# Patient Record
Sex: FEMALE | Race: WHITE | NOT HISPANIC OR LATINO | Employment: FULL TIME | URBAN - METROPOLITAN AREA
[De-identification: names, ages, dates, MRNs, and addresses within clinical notes are randomized per-mention and may not be internally consistent; named-entity substitution may affect disease eponyms.]

---

## 2017-10-06 ENCOUNTER — AMBULATORY - HEALTHEAST (OUTPATIENT)
Dept: NURSING | Facility: CLINIC | Age: 32
End: 2017-10-06

## 2017-12-02 ENCOUNTER — RECORDS - HEALTHEAST (OUTPATIENT)
Dept: ADMINISTRATIVE | Facility: OTHER | Age: 32
End: 2017-12-02

## 2018-03-20 ENCOUNTER — COMMUNICATION - HEALTHEAST (OUTPATIENT)
Dept: SURGERY | Facility: CLINIC | Age: 33
End: 2018-03-20

## 2018-04-09 ENCOUNTER — OFFICE VISIT - HEALTHEAST (OUTPATIENT)
Dept: SURGERY | Facility: CLINIC | Age: 33
End: 2018-04-09

## 2018-04-09 DIAGNOSIS — E66.01 OBESITY, CLASS III, BMI 40-49.9 (MORBID OBESITY) (H): ICD-10-CM

## 2018-04-09 ASSESSMENT — MIFFLIN-ST. JEOR: SCORE: 1927.29

## 2018-04-10 ENCOUNTER — AMBULATORY - HEALTHEAST (OUTPATIENT)
Dept: SURGERY | Facility: CLINIC | Age: 33
End: 2018-04-10

## 2018-05-29 ENCOUNTER — COMMUNICATION - HEALTHEAST (OUTPATIENT)
Dept: SURGERY | Facility: CLINIC | Age: 33
End: 2018-05-29

## 2018-08-16 ENCOUNTER — HOSPITAL ENCOUNTER (EMERGENCY)
Facility: CLINIC | Age: 33
Discharge: HOME | End: 2018-08-16
Attending: NURSE PRACTITIONER | Admitting: NURSE PRACTITIONER

## 2018-08-16 VITALS
TEMPERATURE: 97.8 F | RESPIRATION RATE: 18 BRPM | HEART RATE: 78 BPM | DIASTOLIC BLOOD PRESSURE: 110 MMHG | SYSTOLIC BLOOD PRESSURE: 154 MMHG | OXYGEN SATURATION: 97 %

## 2018-08-16 DIAGNOSIS — M54.6 ACUTE LEFT-SIDED THORACIC BACK PAIN: ICD-10-CM

## 2018-08-16 PROCEDURE — 99282 EMERGENCY DEPT VISIT SF MDM: CPT

## 2018-08-16 RX ORDER — METHOCARBAMOL 500 MG/1
1000 TABLET, FILM COATED ORAL 3 TIMES DAILY PRN
Qty: 20 TABLET | Refills: 0 | Status: SHIPPED | OUTPATIENT
Start: 2018-08-16

## 2018-08-16 ASSESSMENT — ENCOUNTER SYMPTOMS
BACK PAIN: 1
NUMBNESS: 0
ARTHRALGIAS: 1
SHORTNESS OF BREATH: 0
NECK PAIN: 0

## 2018-08-16 NOTE — LETTER
August 16, 2018      To Whom It May Concern:      Shena Chamorro was seen in our Emergency Department today, 08/16/18.  I expect her condition to improve over the next 3 days.  She may return to work/school when improved.    Sincerely,        Radha Ly RN

## 2018-08-16 NOTE — ED AVS SNAPSHOT
[facility] Emergency Department    [address]    [facility] [address]    Phone:  [phone]    Fax:  [phone]                                       Shena Chamorro   MRN: [id number]    Department:  [facility] Emergency Department   Date of Visit:  8/16/2018           Patient Information     Date Of Birth          1985        Your diagnoses for this visit were:     Acute left-sided thoracic back pain        You were seen by Shahida Joaquin, JOSÉ MIGUEL BONNER.      Follow-up Information     Follow up with Your primary care MD In 5 days.    Why:  As needed        Follow up with [facility] Emergency Department.    Specialty:  EMERGENCY MEDICINE    Why:  As needed, If symptoms worsen    Contact information:    [address]  [facility] [id number] [phone]        Discharge Instructions       Continue ibuprofen 600 mg every 6-8 hours.  Use robaxin as needed for severe pain.  No alcohol, driving or operating machinery while taking robaxin.  Continue to remain as active as possible; this will help you heal quicker.  Follow-up with your primary MD if not improved in one week.  Return to ED with increased pain, weakness/numbness in arm, redness or swelling to area or any further concerns.    Discharge Instructions  Back Pain  You were seen today for back pain. Back pain can have many causes, but most will get better without surgery or other specific treatment. Sometimes there is a herniated ( slipped ) disc. We do not usually do MRI scans to look for these right away, since most herniated discs will get better on their own with time.  Today, we did not find any evidence that your back pain was caused by a serious condition. However, sometimes symptoms develop over time and cannot be found during an emergency visit, so it is very important that you follow up with your primary provider.  Generally, every Emergency Department visit should have a  follow-up clinic visit with either a primary or a specialty clinic/provider. Please follow-up as instructed by your emergency provider today.    Return to the Emergency Department if:    You develop a fever with your back pain.     You have weakness or change in sensation in one or both legs.    You lose control of your bowels or bladder, or cannot empty your bladder (cannot pee).    Your pain gets much worse.     Follow-up with your provider:    Unless your pain has completely gone away, please make an appointment with your provider within one week. Most of the routine care for back pain is available in a clinic and not the Emergency Department. You may need further management of your back pain, such as more pain medication, imaging such as an X-ray or MRI, or physical therapy.    What can I do to help myself?    Remain Active -- People are often afraid that they will hurt their back further or delay recovery by remaining active, but this is one of the best things you can do for your back. In fact, staying in bed for a long time to rest is not recommended. Studies have shown that people with low back pain recover faster when they remain active. Movement helps to bring blood flow to the muscles and relieve muscle spasms as well as preventing loss of muscle strength.    Heat -- Using a heating pad can help with low back pain during the first few weeks. Do not sleep with a heating pad, as you can be burned.     Pain medications - You may take a pain medication such as Tylenol  (acetaminophen), Advil , Motrin  (ibuprofen) or Aleve  (naproxen).  If you were given a prescription for medicine here today, be sure to read all of the information (including the package insert) that comes with your prescription.  This will include important information about the medicine, its side effects, and any warnings that you need to know about.  The pharmacist who fills the prescription can provide more information and answer questions  you may have about the medicine.  If you have questions or concerns that the pharmacist cannot address, please call or return to the Emergency Department.   Remember that you can always come back to the Emergency Department if you are not able to see your regular provider in the amount of time listed above, if you get any new symptoms, or if there is anything that worries you.      24 Hour Appointment Hotline       To make an appointment at any [facility], call [phone] ([phone]). If you don't have a family doctor or clinic, we will help you find one. [facility] are conveniently located to serve the needs of you and your family.             Review of your medicines      START taking        Dose / Directions Last dose taken    methocarbamol 500 MG tablet   Commonly known as:  ROBAXIN   Dose:  1000 mg   Quantity:  20 tablet        Take 2 tablets (1,000 mg) by mouth 3 times daily as needed for muscle spasms   Refills:  0                Prescriptions were sent or printed at these locations (1 Prescription)                   Other Prescriptions                Printed at Department/Unit printer (1 of 1)         methocarbamol (ROBAXIN) 500 MG tablet                Orders Needing Specimen Collection     None      Pending Results     No orders found from 8/14/2018 to 8/17/2018.            Pending Culture Results     No orders found from 8/14/2018 to 8/17/2018.            Pending Results Instructions     If you had any lab results that were not finalized at the time of your Discharge, you can call the ED Lab Result RN at [phone]. You will be contacted by this team for any positive Lab results or changes in treatment. The nurses are available 7 days a week from 10A to 6:30P.  You can leave a message 24 hours per day and they will return your call.        Test Results From Your Hospital Stay               Clinical Quality Measure: Blood Pressure Screening     Your blood pressure was checked while  "you were in the emergency department today. The last reading we obtained was  BP: (!) 154/110 . Please read the guidelines below about what these numbers mean and what you should do about them.  If your systolic blood pressure (the top number) is less than 120 and your diastolic blood pressure (the bottom number) is less than 80, then your blood pressure is normal. There is nothing more that you need to do about it.  If your systolic blood pressure (the top number) is 120-139 or your diastolic blood pressure (the bottom number) is 80-89, your blood pressure may be higher than it should be. You should have your blood pressure rechecked within a year by a primary care provider.  If your systolic blood pressure (the top number) is 140 or greater or your diastolic blood pressure (the bottom number) is 90 or greater, you may have high blood pressure. High blood pressure is treatable, but if left untreated over time it can put you at risk for heart attack, stroke, or kidney failure. You should have your blood pressure rechecked by a primary care provider within the next 4 weeks.  If your provider in the emergency department today gave you specific instructions to follow-up with your doctor or provider even sooner than that, you should follow that instruction and not wait for up to 4 weeks for your follow-up visit.        Thank you for choosing [facility]       Thank you for choosing [facility] for your care. Our goal is always to provide you with excellent care. Hearing back from our patients is one way we can continue to improve our services. Please take a few minutes to complete the written survey that you may receive in the mail after you visit with us. Thank you!        [employer] Information     [employer] lets you send messages to your doctor, view your test results, renew your prescriptions, schedule appointments and more. To sign up, go to [email or web address] . Click on \"Log in\" on the left side of the screen, which " "will take you to the Welcome page. Then click on \"Sign up Now\" on the right side of the page.     You will be asked to enter the access code listed below, as well as some personal information. Please follow the directions to create your username and password.     Your access code is: [email or web address]  Expires: 2018 11:52 AM     Your access code will  in 90 days. If you need help or a new code, please call your [place] clinic or [phone].        Care EveryWhere ID     This is your Care EveryWhere ID. This could be used by other organizations to access your [place] medical records  [id number]        Equal Access to Services     MEAGHAN GARCÍA : [facility], grzegorz farmer, ebony fernandez, pauline cardoza. So [facility] [phone].    ATENCIÓN: Si habla español, tiene a bourgeois disposición servicios gratuitos de asistencia lingüística. Llame al [phone].    We comply with applicable federal civil rights laws and [place] laws. We do not discriminate on the basis of race, color, national origin, age, disability, sex, sexual orientation, or gender identity.            After Visit Summary       This is your record. Keep this with you and show to your community pharmacist(s) and doctor(s) at your next visit.                  "

## 2018-08-16 NOTE — ED TRIAGE NOTES
Patient presents with complaints of left shoulder pain which started a few days ago. Patient as no known injury. Patient took Advil at 0800. Patient moving shoulder during triage. CMS is intact.  ABC intact without need for intervention at this time.

## 2018-08-16 NOTE — ED PROVIDER NOTES
History     Chief Complaint:  Shoulder Pain    HPI   Shnea Chamorro is a 32 year old female who presents with left sided scapula pain. The patient stated that a couple or mornings ago she noticed a sharp, left sided shoulder pain that worsened with movement. She described the pain as sharp, and stated that it caused shakiness in her left arm when she would do simple activities such as picking up her cell phone due to the pain. The patient has tried hot pads and ibuprofen, but has seen no results, prompting her to visit the ED today. The patient denies any cervical or spinal pain, shortness of breath, numbness, palpitations or any other associated symptoms.      Allergies:  No Known Drug Allergies    Medications:    Robaxin    Past Medical History:    The patient denies any relevant past medical history.    Past Surgical History:    History reviewed. No pertinent past surgical history.    Family History:    The patient denies any relevant family medical history.    Social History:  Marital Status: Single  Smoking Status: No  Alcohol Use: No    Review of Systems   Respiratory: Negative for shortness of breath.    Musculoskeletal: Positive for arthralgias and back pain. Negative for neck pain.   Neurological: Negative for numbness.   All other systems reviewed and are negative.      Physical Exam     Patient Vitals for the past 24 hrs:   BP Temp Pulse Heart Rate Resp SpO2   08/16/18 1108 (!) 154/110 97.8  F (36.6  C) 78 78 18 97 %       Physical Exam  Constitutional: Alert, attentive, GCS 15.    HEENT: Conjunctiva normal. Mucous membranes moist  CV: regular rate and rhythm; no murmurs, rubs or gallups. Radial pulses 2+ and symmetric. Cap refill <2 seconds.  Respiratory: Effort normal. Lungs clear to auscultation bilaterally. No crackles/rubs/wheezes.  Good air movement.  MSK: Left upper back: Severe palpable muscle spasms with reproducible pain. No cervical tenderness. No pain with palpation of clavicle or  shoulder bones. Full range of motion of shoulder. No pain with palpation of humerus. No erythema or swelling. Distal sensation intact.   Neurological: Alert, attentive.  Skin: Skin is warm, dry and pink.  No rashes or petechiae.  Psychiatric: Normal affect.      Emergency Department Course     Emergency Department Course:  Past medical records, nursing notes, and vitals reviewed.  1114: I performed an exam of the patient and obtained history, as documented above.    I rechecked the patient. Findings and plan explained to the Patient. Patient discharged home with instructions regarding supportive care, medications, and reasons to return. The importance of close follow-up was reviewed.     Impression & Plan      Medical Decision Making:  Shena Chamorro is a 32 year old female presents for evaluation of left upper back and shoulder pain.  On exam she has reproducible tenderness.  There is no redness or swelling or erythema to suggest cellulitis or infectious joint.  No rash suggestive of shingles.  Based upon HPI, PMH and physical exam, I very low suspicion that pain is related to ACS or aortic pathology.  After shared decision making with patient, will not undergo cardiac workup.  She has full range of motion of the shoulder and no bony tenderness, therefore x-rays not indicated due to low suspicion of fracture or dislocation.  She has good distal strength.  There is no evidence of neurovascular compromise.  At this time it appears pain is most likely related to muscle spasms.  She is encouraged to continue 600 mg ibuprofen for Robaxin as needed.  She states she will follow-up in PCP if no improvement within the next few days.  Reasons to return to ED discussed including increasing pain, signs of infection, neurovascular compromise or any further concerns.       Diagnosis:    ICD-10-CM   1. Acute left-sided thoracic back pain M54.6       Disposition:  discharged to home    Discharge Medications:  New Prescriptions     METHOCARBAMOL (ROBAXIN) 500 MG TABLET    Take 2 tablets (1,000 mg) by mouth 3 times daily as needed for muscle spasms         Jesus Hinds  8/16/2018   [facility] EMERGENCY DEPARTMENT    I, Jesus Hinds, am serving as a scribe at 11:14 AM on 8/16/2018 to document services personally performed by Shahida Joaquin APRN based on my observations and the provider's statements to me.        Shahida Joaquin APRN CNP  08/16/18 1252       Shahida Joaquin APRN CNP  08/16/18 1252

## 2018-08-16 NOTE — DISCHARGE INSTRUCTIONS
Continue ibuprofen 600 mg every 6-8 hours.  Use robaxin as needed for severe pain.  No alcohol, driving or operating machinery while taking robaxin.  Continue to remain as active as possible; this will help you heal quicker.  Follow-up with your primary MD if not improved in one week.  Return to ED with increased pain, weakness/numbness in arm, redness or swelling to area or any further concerns.    Discharge Instructions  Back Pain  You were seen today for back pain. Back pain can have many causes, but most will get better without surgery or other specific treatment. Sometimes there is a herniated ( slipped ) disc. We do not usually do MRI scans to look for these right away, since most herniated discs will get better on their own with time.  Today, we did not find any evidence that your back pain was caused by a serious condition. However, sometimes symptoms develop over time and cannot be found during an emergency visit, so it is very important that you follow up with your primary provider.  Generally, every Emergency Department visit should have a follow-up clinic visit with either a primary or a specialty clinic/provider. Please follow-up as instructed by your emergency provider today.    Return to the Emergency Department if:    You develop a fever with your back pain.     You have weakness or change in sensation in one or both legs.    You lose control of your bowels or bladder, or cannot empty your bladder (cannot pee).    Your pain gets much worse.     Follow-up with your provider:    Unless your pain has completely gone away, please make an appointment with your provider within one week. Most of the routine care for back pain is available in a clinic and not the Emergency Department. You may need further management of your back pain, such as more pain medication, imaging such as an X-ray or MRI, or physical therapy.    What can I do to help myself?    Remain Active -- People are often afraid that they will  hurt their back further or delay recovery by remaining active, but this is one of the best things you can do for your back. In fact, staying in bed for a long time to rest is not recommended. Studies have shown that people with low back pain recover faster when they remain active. Movement helps to bring blood flow to the muscles and relieve muscle spasms as well as preventing loss of muscle strength.    Heat -- Using a heating pad can help with low back pain during the first few weeks. Do not sleep with a heating pad, as you can be burned.     Pain medications - You may take a pain medication such as Tylenol  (acetaminophen), Advil , Motrin  (ibuprofen) or Aleve  (naproxen).  If you were given a prescription for medicine here today, be sure to read all of the information (including the package insert) that comes with your prescription.  This will include important information about the medicine, its side effects, and any warnings that you need to know about.  The pharmacist who fills the prescription can provide more information and answer questions you may have about the medicine.  If you have questions or concerns that the pharmacist cannot address, please call or return to the Emergency Department.   Remember that you can always come back to the Emergency Department if you are not able to see your regular provider in the amount of time listed above, if you get any new symptoms, or if there is anything that worries you.

## 2018-11-09 ENCOUNTER — AMBULATORY - HEALTHEAST (OUTPATIENT)
Dept: NURSING | Facility: CLINIC | Age: 33
End: 2018-11-09

## 2019-04-19 ENCOUNTER — OFFICE VISIT - HEALTHEAST (OUTPATIENT)
Dept: FAMILY MEDICINE | Facility: CLINIC | Age: 34
End: 2019-04-19

## 2019-04-19 DIAGNOSIS — I10 ESSENTIAL HYPERTENSION, BENIGN: ICD-10-CM

## 2019-04-19 DIAGNOSIS — F41.1 GENERALIZED ANXIETY DISORDER: ICD-10-CM

## 2019-04-19 DIAGNOSIS — Z00.00 ROUTINE MEDICAL EXAM: ICD-10-CM

## 2019-04-19 DIAGNOSIS — E66.01 MORBID OBESITY (H): ICD-10-CM

## 2019-04-19 DIAGNOSIS — Z13.220 SCREENING, LIPID: ICD-10-CM

## 2019-04-19 LAB
ANION GAP SERPL CALCULATED.3IONS-SCNC: 8 MMOL/L (ref 5–18)
BUN SERPL-MCNC: 11 MG/DL (ref 8–22)
CALCIUM SERPL-MCNC: 8.8 MG/DL (ref 8.5–10.5)
CHLORIDE BLD-SCNC: 110 MMOL/L (ref 98–107)
CHOLEST SERPL-MCNC: 164 MG/DL
CO2 SERPL-SCNC: 24 MMOL/L (ref 22–31)
CREAT SERPL-MCNC: 0.72 MG/DL (ref 0.6–1.1)
FASTING STATUS PATIENT QL REPORTED: YES
GFR SERPL CREATININE-BSD FRML MDRD: >60 ML/MIN/1.73M2
GLUCOSE BLD-MCNC: 86 MG/DL (ref 70–125)
HDLC SERPL-MCNC: 54 MG/DL
LDLC SERPL CALC-MCNC: 90 MG/DL
POTASSIUM BLD-SCNC: 4 MMOL/L (ref 3.5–5)
SODIUM SERPL-SCNC: 142 MMOL/L (ref 136–145)
TRIGL SERPL-MCNC: 98 MG/DL
TSH SERPL DL<=0.005 MIU/L-ACNC: 2.79 UIU/ML (ref 0.3–5)

## 2019-04-19 ASSESSMENT — MIFFLIN-ST. JEOR: SCORE: 2024.69

## 2019-04-22 ENCOUNTER — COMMUNICATION - HEALTHEAST (OUTPATIENT)
Dept: FAMILY MEDICINE | Facility: CLINIC | Age: 34
End: 2019-04-22

## 2019-05-20 ENCOUNTER — OFFICE VISIT - HEALTHEAST (OUTPATIENT)
Dept: FAMILY MEDICINE | Facility: CLINIC | Age: 34
End: 2019-05-20

## 2019-05-20 DIAGNOSIS — I10 ESSENTIAL HYPERTENSION, BENIGN: ICD-10-CM

## 2019-05-20 DIAGNOSIS — F41.1 GENERALIZED ANXIETY DISORDER: ICD-10-CM

## 2019-05-20 LAB
ANION GAP SERPL CALCULATED.3IONS-SCNC: 11 MMOL/L (ref 5–18)
BUN SERPL-MCNC: 14 MG/DL (ref 8–22)
CALCIUM SERPL-MCNC: 8.9 MG/DL (ref 8.5–10.5)
CHLORIDE BLD-SCNC: 107 MMOL/L (ref 98–107)
CO2 SERPL-SCNC: 23 MMOL/L (ref 22–31)
CREAT SERPL-MCNC: 0.76 MG/DL (ref 0.6–1.1)
GFR SERPL CREATININE-BSD FRML MDRD: >60 ML/MIN/1.73M2
GLUCOSE BLD-MCNC: 91 MG/DL (ref 70–125)
POTASSIUM BLD-SCNC: 3.9 MMOL/L (ref 3.5–5)
SODIUM SERPL-SCNC: 141 MMOL/L (ref 136–145)

## 2019-06-25 ENCOUNTER — OFFICE VISIT - HEALTHEAST (OUTPATIENT)
Dept: FAMILY MEDICINE | Facility: CLINIC | Age: 34
End: 2019-06-25

## 2019-06-25 DIAGNOSIS — F41.1 GENERALIZED ANXIETY DISORDER: ICD-10-CM

## 2019-06-25 DIAGNOSIS — I10 ESSENTIAL HYPERTENSION, BENIGN: ICD-10-CM

## 2019-07-17 ENCOUNTER — COMMUNICATION - HEALTHEAST (OUTPATIENT)
Dept: FAMILY MEDICINE | Facility: CLINIC | Age: 34
End: 2019-07-17

## 2019-07-30 ENCOUNTER — OFFICE VISIT - HEALTHEAST (OUTPATIENT)
Dept: FAMILY MEDICINE | Facility: CLINIC | Age: 34
End: 2019-07-30

## 2019-07-30 DIAGNOSIS — I10 ESSENTIAL HYPERTENSION, BENIGN: ICD-10-CM

## 2019-07-30 DIAGNOSIS — E66.01 MORBID OBESITY (H): ICD-10-CM

## 2019-09-13 ENCOUNTER — OFFICE VISIT - HEALTHEAST (OUTPATIENT)
Dept: SURGERY | Facility: CLINIC | Age: 34
End: 2019-09-13

## 2019-09-13 ENCOUNTER — AMBULATORY - HEALTHEAST (OUTPATIENT)
Dept: LAB | Facility: CLINIC | Age: 34
End: 2019-09-13

## 2019-09-13 DIAGNOSIS — M54.5 LOW BACK PAIN, UNSPECIFIED BACK PAIN LATERALITY, UNSPECIFIED CHRONICITY, WITH SCIATICA PRESENCE UNSPECIFIED: ICD-10-CM

## 2019-09-13 DIAGNOSIS — E66.01 MORBID OBESITY WITH BMI OF 40.0-44.9, ADULT (H): ICD-10-CM

## 2019-09-13 DIAGNOSIS — N39.3 STRESS INCONTINENCE OF URINE: ICD-10-CM

## 2019-09-13 LAB
ALBUMIN SERPL-MCNC: 4.3 G/DL (ref 3.5–5)
ALP SERPL-CCNC: 67 U/L (ref 45–120)
ALT SERPL W P-5'-P-CCNC: 27 U/L (ref 0–45)
AST SERPL W P-5'-P-CCNC: 22 U/L (ref 0–40)
BILIRUB DIRECT SERPL-MCNC: 0.1 MG/DL
BILIRUB SERPL-MCNC: 0.3 MG/DL (ref 0–1)
ERYTHROCYTE [DISTWIDTH] IN BLOOD BY AUTOMATED COUNT: 13.2 % (ref 11–14.5)
FERRITIN SERPL-MCNC: 63 NG/ML (ref 10–130)
FOLATE SERPL-MCNC: 12.3 NG/ML
HCT VFR BLD AUTO: 43 % (ref 35–47)
HGB BLD-MCNC: 14.6 G/DL (ref 12–16)
MCH RBC QN AUTO: 28.7 PG (ref 27–34)
MCHC RBC AUTO-ENTMCNC: 34 G/DL (ref 32–36)
MCV RBC AUTO: 85 FL (ref 80–100)
PLATELET # BLD AUTO: 265 THOU/UL (ref 140–440)
PMV BLD AUTO: 11 FL (ref 8.5–12.5)
PROT SERPL-MCNC: 7.8 G/DL (ref 6–8)
PTH-INTACT SERPL-MCNC: 123 PG/ML (ref 10–86)
RBC # BLD AUTO: 5.09 MILL/UL (ref 3.8–5.4)
VIT B12 SERPL-MCNC: 482 PG/ML (ref 213–816)
WBC: 10 THOU/UL (ref 4–11)

## 2019-09-13 ASSESSMENT — MIFFLIN-ST. JEOR: SCORE: 2051.46

## 2019-09-15 LAB — HBA1C MFR BLD: 5 % (ref 4.2–6.1)

## 2019-09-16 LAB
25(OH)D3 SERPL-MCNC: 23.3 NG/ML (ref 30–80)
ANNOTATION COMMENT IMP: NORMAL
VIT A SERPL-MCNC: 0.46 MG/L (ref 0.3–1.2)
VITAMIN A (RETINYL PALMITATE): <0.02 MG/L (ref 0–0.1)

## 2019-09-17 LAB — ZINC SERPL-MCNC: 83.2 UG/DL (ref 60–120)

## 2019-09-18 LAB — VIT B1 PYROPHOSHATE BLD-SCNC: 117 NMOL/L (ref 70–180)

## 2019-09-29 ENCOUNTER — COMMUNICATION - HEALTHEAST (OUTPATIENT)
Dept: SURGERY | Facility: CLINIC | Age: 34
End: 2019-09-29

## 2019-09-29 ENCOUNTER — AMBULATORY - HEALTHEAST (OUTPATIENT)
Dept: SURGERY | Facility: CLINIC | Age: 34
End: 2019-09-29

## 2019-09-29 DIAGNOSIS — E56.9 VITAMIN DEFICIENCY: ICD-10-CM

## 2019-09-29 DIAGNOSIS — R79.89 INCREASED PTH LEVEL: ICD-10-CM

## 2019-10-03 ENCOUNTER — COMMUNICATION - HEALTHEAST (OUTPATIENT)
Dept: FAMILY MEDICINE | Facility: CLINIC | Age: 34
End: 2019-10-03

## 2019-10-08 ENCOUNTER — OFFICE VISIT - HEALTHEAST (OUTPATIENT)
Dept: FAMILY MEDICINE | Facility: CLINIC | Age: 34
End: 2019-10-08

## 2019-10-08 DIAGNOSIS — Z23 NEED FOR INFLUENZA VACCINATION: ICD-10-CM

## 2019-10-08 DIAGNOSIS — E66.01 MORBID OBESITY WITH BMI OF 40.0-44.9, ADULT (H): ICD-10-CM

## 2019-10-08 DIAGNOSIS — I10 ESSENTIAL HYPERTENSION, BENIGN: ICD-10-CM

## 2019-10-11 ENCOUNTER — COMMUNICATION - HEALTHEAST (OUTPATIENT)
Dept: FAMILY MEDICINE | Facility: CLINIC | Age: 34
End: 2019-10-11

## 2019-11-04 ENCOUNTER — OFFICE VISIT - HEALTHEAST (OUTPATIENT)
Dept: FAMILY MEDICINE | Facility: CLINIC | Age: 34
End: 2019-11-04

## 2019-11-04 DIAGNOSIS — I10 ESSENTIAL HYPERTENSION, BENIGN: ICD-10-CM

## 2019-11-04 DIAGNOSIS — G89.29 CHRONIC LEFT-SIDED LOW BACK PAIN WITHOUT SCIATICA: ICD-10-CM

## 2019-11-04 DIAGNOSIS — M54.50 CHRONIC LEFT-SIDED LOW BACK PAIN WITHOUT SCIATICA: ICD-10-CM

## 2019-11-18 ENCOUNTER — OFFICE VISIT - HEALTHEAST (OUTPATIENT)
Dept: SURGERY | Facility: CLINIC | Age: 34
End: 2019-11-18

## 2019-11-18 DIAGNOSIS — Z71.3 NUTRITIONAL COUNSELING: ICD-10-CM

## 2019-11-18 DIAGNOSIS — E66.01 MORBID OBESITY (H): ICD-10-CM

## 2019-11-18 DIAGNOSIS — E66.01 OBESITY, CLASS III, BMI 40-49.9 (MORBID OBESITY) (H): ICD-10-CM

## 2019-11-18 ASSESSMENT — MIFFLIN-ST. JEOR: SCORE: 2081.85

## 2019-12-02 ENCOUNTER — OFFICE VISIT - HEALTHEAST (OUTPATIENT)
Dept: SURGERY | Facility: CLINIC | Age: 34
End: 2019-12-02

## 2019-12-02 DIAGNOSIS — E66.01 MORBID OBESITY (H): ICD-10-CM

## 2019-12-12 ENCOUNTER — COMMUNICATION - HEALTHEAST (OUTPATIENT)
Dept: FAMILY MEDICINE | Facility: CLINIC | Age: 34
End: 2019-12-12

## 2019-12-12 DIAGNOSIS — I10 ESSENTIAL HYPERTENSION, BENIGN: ICD-10-CM

## 2019-12-13 ENCOUNTER — OFFICE VISIT - HEALTHEAST (OUTPATIENT)
Dept: SURGERY | Facility: CLINIC | Age: 34
End: 2019-12-13

## 2019-12-13 DIAGNOSIS — Z71.3 NUTRITIONAL COUNSELING: ICD-10-CM

## 2019-12-13 DIAGNOSIS — E66.01 OBESITY, CLASS III, BMI 40-49.9 (MORBID OBESITY) (H): ICD-10-CM

## 2019-12-13 ASSESSMENT — MIFFLIN-ST. JEOR: SCORE: 2080.03

## 2019-12-31 ENCOUNTER — RECORDS - HEALTHEAST (OUTPATIENT)
Dept: ADMINISTRATIVE | Facility: OTHER | Age: 34
End: 2019-12-31

## 2020-01-02 ENCOUNTER — COMMUNICATION - HEALTHEAST (OUTPATIENT)
Dept: SCHEDULING | Facility: CLINIC | Age: 35
End: 2020-01-02

## 2020-01-09 ENCOUNTER — COMMUNICATION - HEALTHEAST (OUTPATIENT)
Dept: SCHEDULING | Facility: CLINIC | Age: 35
End: 2020-01-09

## 2020-01-10 ENCOUNTER — OFFICE VISIT - HEALTHEAST (OUTPATIENT)
Dept: SURGERY | Facility: CLINIC | Age: 35
End: 2020-01-10

## 2020-01-10 DIAGNOSIS — Z71.3 NUTRITIONAL COUNSELING: ICD-10-CM

## 2020-01-10 DIAGNOSIS — E66.01 OBESITY, CLASS III, BMI 40-49.9 (MORBID OBESITY) (H): ICD-10-CM

## 2020-01-10 ASSESSMENT — MIFFLIN-ST. JEOR: SCORE: 2064.16

## 2020-01-13 ENCOUNTER — OFFICE VISIT - HEALTHEAST (OUTPATIENT)
Dept: SURGERY | Facility: CLINIC | Age: 35
End: 2020-01-13

## 2020-01-13 DIAGNOSIS — E66.01 MORBID OBESITY (H): ICD-10-CM

## 2020-01-15 ENCOUNTER — COMMUNICATION - HEALTHEAST (OUTPATIENT)
Dept: FAMILY MEDICINE | Facility: CLINIC | Age: 35
End: 2020-01-15

## 2020-01-17 ENCOUNTER — OFFICE VISIT - HEALTHEAST (OUTPATIENT)
Dept: FAMILY MEDICINE | Facility: CLINIC | Age: 35
End: 2020-01-17

## 2020-01-17 DIAGNOSIS — J04.0 LARYNGITIS: ICD-10-CM

## 2020-01-17 ASSESSMENT — MIFFLIN-ST. JEOR: SCORE: 2112.69

## 2020-02-14 ENCOUNTER — OFFICE VISIT - HEALTHEAST (OUTPATIENT)
Dept: SURGERY | Facility: CLINIC | Age: 35
End: 2020-02-14

## 2020-02-14 DIAGNOSIS — I10 ESSENTIAL HYPERTENSION, BENIGN: ICD-10-CM

## 2020-02-14 DIAGNOSIS — Z71.3 NUTRITIONAL COUNSELING: ICD-10-CM

## 2020-02-14 DIAGNOSIS — E66.01 OBESITY, CLASS III, BMI 40-49.9 (MORBID OBESITY) (H): ICD-10-CM

## 2020-02-14 ASSESSMENT — MIFFLIN-ST. JEOR: SCORE: 2074.59

## 2020-03-10 ENCOUNTER — COMMUNICATION - HEALTHEAST (OUTPATIENT)
Dept: SURGERY | Facility: CLINIC | Age: 35
End: 2020-03-10

## 2020-03-13 ENCOUNTER — COMMUNICATION - HEALTHEAST (OUTPATIENT)
Dept: SURGERY | Facility: CLINIC | Age: 35
End: 2020-03-13

## 2020-03-23 ENCOUNTER — OFFICE VISIT - HEALTHEAST (OUTPATIENT)
Dept: SURGERY | Facility: CLINIC | Age: 35
End: 2020-03-23

## 2020-03-23 DIAGNOSIS — E66.01 OBESITY, CLASS III, BMI 40-49.9 (MORBID OBESITY) (H): ICD-10-CM

## 2020-03-23 DIAGNOSIS — Z71.3 NUTRITIONAL COUNSELING: ICD-10-CM

## 2020-03-23 DIAGNOSIS — I10 ESSENTIAL HYPERTENSION, BENIGN: ICD-10-CM

## 2020-03-30 ENCOUNTER — COMMUNICATION - HEALTHEAST (OUTPATIENT)
Dept: SURGERY | Facility: CLINIC | Age: 35
End: 2020-03-30

## 2020-04-13 ENCOUNTER — COMMUNICATION - HEALTHEAST (OUTPATIENT)
Dept: FAMILY MEDICINE | Facility: CLINIC | Age: 35
End: 2020-04-13

## 2020-04-23 ENCOUNTER — OFFICE VISIT - HEALTHEAST (OUTPATIENT)
Dept: SURGERY | Facility: CLINIC | Age: 35
End: 2020-04-23

## 2020-04-23 DIAGNOSIS — I10 ESSENTIAL HYPERTENSION: ICD-10-CM

## 2020-04-23 DIAGNOSIS — E66.01 MORBID OBESITY (H): ICD-10-CM

## 2020-04-29 ENCOUNTER — AMBULATORY - HEALTHEAST (OUTPATIENT)
Dept: SURGERY | Facility: CLINIC | Age: 35
End: 2020-04-29

## 2020-05-26 ENCOUNTER — COMMUNICATION - HEALTHEAST (OUTPATIENT)
Dept: FAMILY MEDICINE | Facility: CLINIC | Age: 35
End: 2020-05-26

## 2020-05-27 ENCOUNTER — OFFICE VISIT - HEALTHEAST (OUTPATIENT)
Dept: FAMILY MEDICINE | Facility: CLINIC | Age: 35
End: 2020-05-27

## 2020-05-27 DIAGNOSIS — I10 ESSENTIAL HYPERTENSION, BENIGN: ICD-10-CM

## 2020-05-27 DIAGNOSIS — F41.1 GENERALIZED ANXIETY DISORDER: ICD-10-CM

## 2020-05-27 DIAGNOSIS — E66.01 MORBID OBESITY WITH BMI OF 40.0-44.9, ADULT (H): ICD-10-CM

## 2020-05-27 ASSESSMENT — MIFFLIN-ST. JEOR: SCORE: 2085.02

## 2020-06-01 ENCOUNTER — COMMUNICATION - HEALTHEAST (OUTPATIENT)
Dept: FAMILY MEDICINE | Facility: CLINIC | Age: 35
End: 2020-06-01

## 2020-06-25 ENCOUNTER — COMMUNICATION - HEALTHEAST (OUTPATIENT)
Dept: FAMILY MEDICINE | Facility: CLINIC | Age: 35
End: 2020-06-25

## 2020-06-25 ENCOUNTER — AMBULATORY - HEALTHEAST (OUTPATIENT)
Dept: SURGERY | Facility: CLINIC | Age: 35
End: 2020-06-25

## 2020-06-25 ENCOUNTER — COMMUNICATION - HEALTHEAST (OUTPATIENT)
Dept: SURGERY | Facility: CLINIC | Age: 35
End: 2020-06-25

## 2020-06-25 ENCOUNTER — SURGERY - HEALTHEAST (OUTPATIENT)
Dept: SURGERY | Facility: CLINIC | Age: 35
End: 2020-06-25

## 2020-06-25 DIAGNOSIS — E66.01 MORBID OBESITY (H): ICD-10-CM

## 2020-06-25 DIAGNOSIS — Z11.59 ENCOUNTER FOR SCREENING FOR OTHER VIRAL DISEASES: ICD-10-CM

## 2020-06-25 DIAGNOSIS — I10 HTN (HYPERTENSION): ICD-10-CM

## 2020-06-26 ENCOUNTER — COMMUNICATION - HEALTHEAST (OUTPATIENT)
Dept: SURGERY | Facility: CLINIC | Age: 35
End: 2020-06-26

## 2020-06-26 ENCOUNTER — SURGERY - HEALTHEAST (OUTPATIENT)
Dept: SURGERY | Facility: CLINIC | Age: 35
End: 2020-06-26

## 2020-07-01 ENCOUNTER — AMBULATORY - HEALTHEAST (OUTPATIENT)
Dept: SURGERY | Facility: CLINIC | Age: 35
End: 2020-07-01

## 2020-07-01 DIAGNOSIS — K21.9 ACID REFLUX: ICD-10-CM

## 2020-07-01 DIAGNOSIS — Z98.84 S/P BARIATRIC SURGERY: ICD-10-CM

## 2020-07-01 DIAGNOSIS — R63.4 RAPID WEIGHT LOSS: ICD-10-CM

## 2020-07-01 DIAGNOSIS — Z71.89 ENCOUNTER FOR PRE-BARIATRIC SURGERY COUNSELING AND EDUCATION: ICD-10-CM

## 2020-07-01 DIAGNOSIS — Z01.818 PRE-OP TESTING: ICD-10-CM

## 2020-07-10 ENCOUNTER — COMMUNICATION - HEALTHEAST (OUTPATIENT)
Dept: FAMILY MEDICINE | Facility: CLINIC | Age: 35
End: 2020-07-10

## 2020-07-10 DIAGNOSIS — I10 ESSENTIAL HYPERTENSION, BENIGN: ICD-10-CM

## 2020-07-13 ENCOUNTER — OFFICE VISIT - HEALTHEAST (OUTPATIENT)
Dept: FAMILY MEDICINE | Facility: CLINIC | Age: 35
End: 2020-07-13

## 2020-07-13 ENCOUNTER — AMBULATORY - HEALTHEAST (OUTPATIENT)
Dept: FAMILY MEDICINE | Facility: CLINIC | Age: 35
End: 2020-07-13

## 2020-07-13 ENCOUNTER — RECORDS - HEALTHEAST (OUTPATIENT)
Dept: GENERAL RADIOLOGY | Facility: CLINIC | Age: 35
End: 2020-07-13

## 2020-07-13 DIAGNOSIS — Z11.59 ENCOUNTER FOR SCREENING FOR OTHER VIRAL DISEASES: ICD-10-CM

## 2020-07-13 DIAGNOSIS — G89.29 CHRONIC LEFT-SIDED LOW BACK PAIN WITHOUT SCIATICA: ICD-10-CM

## 2020-07-13 DIAGNOSIS — M54.50 CHRONIC LEFT-SIDED LOW BACK PAIN WITHOUT SCIATICA: ICD-10-CM

## 2020-07-13 DIAGNOSIS — Z01.818 PREOP GENERAL PHYSICAL EXAM: ICD-10-CM

## 2020-07-13 DIAGNOSIS — I10 ESSENTIAL HYPERTENSION, BENIGN: ICD-10-CM

## 2020-07-13 DIAGNOSIS — Z01.818 PRE-OP TESTING: ICD-10-CM

## 2020-07-13 DIAGNOSIS — Z01.818 ENCOUNTER FOR OTHER PREPROCEDURAL EXAMINATION: ICD-10-CM

## 2020-07-13 DIAGNOSIS — F41.1 GENERALIZED ANXIETY DISORDER: ICD-10-CM

## 2020-07-13 DIAGNOSIS — E66.01 MORBID OBESITY WITH BMI OF 40.0-44.9, ADULT (H): ICD-10-CM

## 2020-07-13 LAB
ALBUMIN SERPL-MCNC: 4.2 G/DL (ref 3.5–5)
ALBUMIN UR-MCNC: ABNORMAL MG/DL
ALP SERPL-CCNC: 50 U/L (ref 45–120)
ALT SERPL W P-5'-P-CCNC: 50 U/L (ref 0–45)
ANION GAP SERPL CALCULATED.3IONS-SCNC: 8 MMOL/L (ref 5–18)
APPEARANCE UR: CLEAR
APTT PPP: 34 SECONDS (ref 24–37)
AST SERPL W P-5'-P-CCNC: 30 U/L (ref 0–40)
BACTERIA #/AREA URNS HPF: ABNORMAL HPF
BASOPHILS # BLD AUTO: 0.1 THOU/UL (ref 0–0.2)
BASOPHILS NFR BLD AUTO: 1 % (ref 0–2)
BILIRUB SERPL-MCNC: 0.4 MG/DL (ref 0–1)
BILIRUB UR QL STRIP: ABNORMAL
BUN SERPL-MCNC: 10 MG/DL (ref 8–22)
CALCIUM SERPL-MCNC: 8.8 MG/DL (ref 8.5–10.5)
CHLORIDE BLD-SCNC: 106 MMOL/L (ref 98–107)
CO2 SERPL-SCNC: 26 MMOL/L (ref 22–31)
COLOR UR AUTO: YELLOW
CREAT SERPL-MCNC: 0.8 MG/DL (ref 0.6–1.1)
EOSINOPHIL # BLD AUTO: 0.2 THOU/UL (ref 0–0.4)
EOSINOPHIL NFR BLD AUTO: 2 % (ref 0–6)
ERYTHROCYTE [DISTWIDTH] IN BLOOD BY AUTOMATED COUNT: 12.7 % (ref 11–14.5)
GFR SERPL CREATININE-BSD FRML MDRD: >60 ML/MIN/1.73M2
GLUCOSE BLD-MCNC: 90 MG/DL (ref 70–125)
GLUCOSE UR STRIP-MCNC: NEGATIVE MG/DL
HCT VFR BLD AUTO: 42 % (ref 35–47)
HGB BLD-MCNC: 14.3 G/DL (ref 12–16)
HGB UR QL STRIP: ABNORMAL
INR PPP: 1.06 (ref 0.9–1.1)
KETONES UR STRIP-MCNC: NEGATIVE MG/DL
LEUKOCYTE ESTERASE UR QL STRIP: NEGATIVE
LYMPHOCYTES # BLD AUTO: 2.6 THOU/UL (ref 0.8–4.4)
LYMPHOCYTES NFR BLD AUTO: 31 % (ref 20–40)
MCH RBC QN AUTO: 29.5 PG (ref 27–34)
MCHC RBC AUTO-ENTMCNC: 34.1 G/DL (ref 32–36)
MCV RBC AUTO: 86 FL (ref 80–100)
MONOCYTES # BLD AUTO: 0.4 THOU/UL (ref 0–0.9)
MONOCYTES NFR BLD AUTO: 4 % (ref 2–10)
MUCOUS THREADS #/AREA URNS LPF: ABNORMAL LPF
NEUTROPHILS # BLD AUTO: 5.3 THOU/UL (ref 2–7.7)
NEUTROPHILS NFR BLD AUTO: 62 % (ref 50–70)
NITRATE UR QL: NEGATIVE
PH UR STRIP: 7 [PH] (ref 5–8)
PLATELET # BLD AUTO: 267 THOU/UL (ref 140–440)
PMV BLD AUTO: 8.7 FL (ref 7–10)
POTASSIUM BLD-SCNC: 3.7 MMOL/L (ref 3.5–5)
PROT SERPL-MCNC: 7.1 G/DL (ref 6–8)
RBC # BLD AUTO: 4.86 MILL/UL (ref 3.8–5.4)
RBC #/AREA URNS AUTO: ABNORMAL HPF
SODIUM SERPL-SCNC: 140 MMOL/L (ref 136–145)
SP GR UR STRIP: 1.02 (ref 1–1.03)
SPERM #/AREA URNS HPF: PRESENT /[HPF]
SQUAMOUS #/AREA URNS AUTO: ABNORMAL LPF
UROBILINOGEN UR STRIP-ACNC: ABNORMAL
WBC #/AREA URNS AUTO: ABNORMAL HPF
WBC: 8.5 THOU/UL (ref 4–11)

## 2020-07-13 ASSESSMENT — MIFFLIN-ST. JEOR: SCORE: 2034.78

## 2020-07-14 ENCOUNTER — ANESTHESIA - HEALTHEAST (OUTPATIENT)
Dept: SURGERY | Facility: CLINIC | Age: 35
End: 2020-07-14

## 2020-07-15 ENCOUNTER — SURGERY - HEALTHEAST (OUTPATIENT)
Dept: SURGERY | Facility: CLINIC | Age: 35
End: 2020-07-15

## 2020-07-15 LAB
ATRIAL RATE - MUSE: 72 BPM
DIASTOLIC BLOOD PRESSURE - MUSE: NORMAL
INTERPRETATION ECG - MUSE: NORMAL
P AXIS - MUSE: 53 DEGREES
PR INTERVAL - MUSE: 158 MS
QRS DURATION - MUSE: 104 MS
QT - MUSE: 398 MS
QTC - MUSE: 435 MS
R AXIS - MUSE: 68 DEGREES
SYSTOLIC BLOOD PRESSURE - MUSE: NORMAL
T AXIS - MUSE: 50 DEGREES
VENTRICULAR RATE- MUSE: 72 BPM

## 2020-07-15 ASSESSMENT — MIFFLIN-ST. JEOR: SCORE: 2053.45

## 2020-07-17 ENCOUNTER — COMMUNICATION - HEALTHEAST (OUTPATIENT)
Dept: SURGERY | Facility: CLINIC | Age: 35
End: 2020-07-17

## 2020-07-17 DIAGNOSIS — Z98.84 S/P BARIATRIC SURGERY: ICD-10-CM

## 2020-07-17 DIAGNOSIS — K21.9 ACID REFLUX: ICD-10-CM

## 2020-07-18 ENCOUNTER — COMMUNICATION - HEALTHEAST (OUTPATIENT)
Dept: SURGERY | Facility: CLINIC | Age: 35
End: 2020-07-18

## 2020-07-20 ENCOUNTER — COMMUNICATION - HEALTHEAST (OUTPATIENT)
Dept: SURGERY | Facility: CLINIC | Age: 35
End: 2020-07-20

## 2020-07-21 ENCOUNTER — AMBULATORY - HEALTHEAST (OUTPATIENT)
Dept: SURGERY | Facility: CLINIC | Age: 35
End: 2020-07-21

## 2020-07-21 ENCOUNTER — COMMUNICATION - HEALTHEAST (OUTPATIENT)
Dept: FAMILY MEDICINE | Facility: CLINIC | Age: 35
End: 2020-07-21

## 2020-07-21 DIAGNOSIS — Z98.84 BARIATRIC SURGERY STATUS: ICD-10-CM

## 2020-07-21 DIAGNOSIS — Z71.3 NUTRITIONAL COUNSELING: ICD-10-CM

## 2020-07-23 ENCOUNTER — OFFICE VISIT - HEALTHEAST (OUTPATIENT)
Dept: FAMILY MEDICINE | Facility: CLINIC | Age: 35
End: 2020-07-23

## 2020-07-23 DIAGNOSIS — Z98.84 S/P LAPAROSCOPIC SLEEVE GASTRECTOMY: ICD-10-CM

## 2020-07-23 DIAGNOSIS — E66.01 MORBID OBESITY WITH BMI OF 40.0-44.9, ADULT (H): ICD-10-CM

## 2020-07-23 ASSESSMENT — MIFFLIN-ST. JEOR: SCORE: 1938.06

## 2020-07-28 ENCOUNTER — OFFICE VISIT - HEALTHEAST (OUTPATIENT)
Dept: SURGERY | Facility: CLINIC | Age: 35
End: 2020-07-28

## 2020-07-28 DIAGNOSIS — Z98.84 S/P LAPAROSCOPIC SLEEVE GASTRECTOMY: ICD-10-CM

## 2020-10-14 ENCOUNTER — COMMUNICATION - HEALTHEAST (OUTPATIENT)
Dept: FAMILY MEDICINE | Facility: CLINIC | Age: 35
End: 2020-10-14

## 2020-10-14 ENCOUNTER — COMMUNICATION - HEALTHEAST (OUTPATIENT)
Dept: SURGERY | Facility: CLINIC | Age: 35
End: 2020-10-14

## 2020-10-14 DIAGNOSIS — J04.0 LARYNGITIS: ICD-10-CM

## 2020-10-16 ENCOUNTER — COMMUNICATION - HEALTHEAST (OUTPATIENT)
Dept: SURGERY | Facility: CLINIC | Age: 35
End: 2020-10-16

## 2020-10-16 DIAGNOSIS — K21.9 ACID REFLUX: ICD-10-CM

## 2020-10-16 DIAGNOSIS — Z98.84 S/P BARIATRIC SURGERY: ICD-10-CM

## 2020-11-25 ENCOUNTER — COMMUNICATION - HEALTHEAST (OUTPATIENT)
Dept: SURGERY | Facility: CLINIC | Age: 35
End: 2020-11-25

## 2020-12-03 ENCOUNTER — AMBULATORY - HEALTHEAST (OUTPATIENT)
Dept: SURGERY | Facility: CLINIC | Age: 35
End: 2020-12-03

## 2020-12-03 DIAGNOSIS — K59.00 CONSTIPATION: ICD-10-CM

## 2020-12-03 DIAGNOSIS — Z98.84 BARIATRIC SURGERY STATUS: ICD-10-CM

## 2021-01-06 ENCOUNTER — COMMUNICATION - HEALTHEAST (OUTPATIENT)
Dept: FAMILY MEDICINE | Facility: CLINIC | Age: 36
End: 2021-01-06

## 2021-01-06 DIAGNOSIS — I10 ESSENTIAL HYPERTENSION, BENIGN: ICD-10-CM

## 2021-01-14 ENCOUNTER — COMMUNICATION - HEALTHEAST (OUTPATIENT)
Dept: FAMILY MEDICINE | Facility: CLINIC | Age: 36
End: 2021-01-14

## 2021-01-15 ENCOUNTER — COMMUNICATION - HEALTHEAST (OUTPATIENT)
Dept: SURGERY | Facility: CLINIC | Age: 36
End: 2021-01-15

## 2021-01-19 ENCOUNTER — COMMUNICATION - HEALTHEAST (OUTPATIENT)
Dept: FAMILY MEDICINE | Facility: CLINIC | Age: 36
End: 2021-01-19

## 2021-01-19 DIAGNOSIS — K58.9 IRRITABLE BOWEL SYNDROME, UNSPECIFIED TYPE: ICD-10-CM

## 2021-01-21 ENCOUNTER — COMMUNICATION - HEALTHEAST (OUTPATIENT)
Dept: FAMILY MEDICINE | Facility: CLINIC | Age: 36
End: 2021-01-21

## 2021-02-01 ENCOUNTER — COMMUNICATION - HEALTHEAST (OUTPATIENT)
Dept: FAMILY MEDICINE | Facility: CLINIC | Age: 36
End: 2021-02-01

## 2021-02-12 ENCOUNTER — COMMUNICATION - HEALTHEAST (OUTPATIENT)
Dept: ADMINISTRATIVE | Facility: CLINIC | Age: 36
End: 2021-02-12

## 2021-02-12 ENCOUNTER — COMMUNICATION - HEALTHEAST (OUTPATIENT)
Dept: FAMILY MEDICINE | Facility: CLINIC | Age: 36
End: 2021-02-12

## 2021-02-12 DIAGNOSIS — F41.1 GENERALIZED ANXIETY DISORDER: ICD-10-CM

## 2021-02-22 ENCOUNTER — COMMUNICATION - HEALTHEAST (OUTPATIENT)
Dept: SURGERY | Facility: CLINIC | Age: 36
End: 2021-02-22

## 2021-02-22 DIAGNOSIS — Z98.84 S/P BARIATRIC SURGERY: ICD-10-CM

## 2021-02-22 DIAGNOSIS — K21.9 ACID REFLUX: ICD-10-CM

## 2021-02-24 ENCOUNTER — COMMUNICATION - HEALTHEAST (OUTPATIENT)
Dept: SURGERY | Facility: CLINIC | Age: 36
End: 2021-02-24

## 2021-02-24 DIAGNOSIS — R63.4 RAPID WEIGHT LOSS: ICD-10-CM

## 2021-02-24 DIAGNOSIS — Z98.84 S/P BARIATRIC SURGERY: ICD-10-CM

## 2021-04-20 ENCOUNTER — COMMUNICATION - HEALTHEAST (OUTPATIENT)
Dept: SURGERY | Facility: CLINIC | Age: 36
End: 2021-04-20

## 2021-04-20 DIAGNOSIS — Z98.84 S/P BARIATRIC SURGERY: ICD-10-CM

## 2021-04-20 DIAGNOSIS — R63.4 RAPID WEIGHT LOSS: ICD-10-CM

## 2021-05-24 ENCOUNTER — COMMUNICATION - HEALTHEAST (OUTPATIENT)
Dept: FAMILY MEDICINE | Facility: CLINIC | Age: 36
End: 2021-05-24

## 2021-05-24 DIAGNOSIS — F41.1 GENERALIZED ANXIETY DISORDER: ICD-10-CM

## 2021-05-26 ENCOUNTER — RECORDS - HEALTHEAST (OUTPATIENT)
Dept: FAMILY MEDICINE | Facility: CLINIC | Age: 36
End: 2021-05-26

## 2021-05-28 NOTE — PROGRESS NOTES
1. Generalized anxiety disorder  buPROPion (WELLBUTRIN XL) 150 MG 24 hr tablet   2. Essential hypertension, benign  Basic Metabolic Panel        Plan: Since the escitalopram did not really work out very well for her, we can try changing her to bupropion 150 mg a day.  She was warned that certainly she has side effects to any of these current medications and she is willing to try something a bit different.  She also has some familiarity with it because her sister was on this medication.  Hopefully this will work out better for her.  We can check in with her again in 6 to 8 weeks and see how she is doing on his new medication.    Her blood pressure certainly not where I would like to be at but it is a lot better than it was last time we checked it.  I would encourage her to continue trying to increase her exercise levels, losing weight, and eating better and continue with the hydrochlorothiazide and we can see her again in the same amount of time to recheck the blood pressure as well.    Subjective: 33-year-old female who is here today for recheck.  We put her on some escitalopram for some depression and anxiety about 6 weeks ago.  She really was not able to take it because she gets severe hot flashes from it.  She states that they were intolerable and she could not last out a few weeks to see if it would resolve on its own as it was pretty intense.  She is hoping was having a bit different that may not have such a same side effect is that.    Also, we started her on some hydrochlorothiazide for some blood pressure at that time, and she has been tolerating that very well.  She has been urinating somewhat more frequently but not too intolerable.  She has been checking her blood pressure and it is been better and it is better than when she is in here today.  She has no significant side effects to that medication.    Objective: Well-appearing female no acute distress.  Vital signs as noted.  Blood pressure recheck shows  her systolic much better her diastolics a little bit high.  Chest clear to auscultation.  Heart regular rate and rhythm.  Abdomen is benign.  Extremities without edema.

## 2021-05-30 NOTE — PROGRESS NOTES
1. Essential hypertension, benign  hydroCHLOROthiazide (HYDRODIURIL) 25 MG tablet   2. Generalized anxiety disorder  busPIRone (BUSPAR) 15 MG tablet        Plan: For her blood pressure, I think we can do a little bit better on this so we will put her up to 25 mg of the hydrochlorothiazide daily.  We can check back with her in 4 to 6 weeks to make sure that she is doing better with this.  We could also do some blood work at that time to make sure potassium is responding appropriately.  If she has any trouble with this in the meantime she will let me know.    For her anxiety, we want to stay with the same SSRI medication, but we will add buspirone at 7.5 mg twice a day to start.  We could increase this dose down the road if we need to.  She is not thrilled about taking a twice daily medication, but she will try to be compliant with that and see how things go.  Side effects and interactions of the medications discussed with the patient.  We will follow-up with her again in 4 to 6 weeks to see how things are coming along for her anxiety.    Subjective: 33-year-old female is here today for a couple of things.  We will recheck her blood pressure as we started her on some hydrochlorothiazide a bit ago.  She reports doing well with the medication and is not having any side effects or problems with it.  She does not really think that it helped her blood pressure all that much, and she does not really feel that a diuresis has been happening if she is not really been urinating anymore frequently than before.    Also for anxiety, she is on bupropion at 150 mg daily.  She has started to get some headaches, and she is not sure if it is from that or from her blood pressure or from her anxiety itself.  She would like to try something in addition to the bupropion if possible as she does not really want to go up to the maximum dose at this time.  She again does not have a lot of side effects other than possibly the headache she  mentioned before.    Objective: Well-appearing female no acute distress.  Vital signs as noted.  Ears are clear bilaterally.  Eyes and nose are normal.  Oropharynx is clear.  Chest clear to auscultation.  Heart regular rate and rhythm.  Neurologically intact and she is alert and oriented and pleasant today.

## 2021-05-30 NOTE — PROGRESS NOTES
1. Essential hypertension, benign     2. Morbid obesity (H)  Ambulatory referral to Bariatric Care: Surgical and Non-Surgical        Plan: We will keep her on her hydrochlorothiazide 25 mg a day.  She will continue to work on losing weight and exercise.  She will continue to monitor her blood pressure in the interim.    She is also interested today in a referral to someone to discuss her weight.  I suggested that we have her see the focused on the bariatric center to discuss nonsurgical ways that she could be helped in losing some weight and she is very excited about that and looking forward to that appointment.  We can follow-up with her then after she has been seen there and as needed.    Subjective: 33-year-old female is here today for a blood pressure recheck.  We did put her on an increased dose of hydrochlorothiazide, up to 25 mg a day about a month ago.  She states her blood pressures have been up and down but still not really been at goal much.  She does not any chest pain or shortness of breath.  She did not have any new side effects to the increased dose of medication.    Also, we are here to talk about her anxiety today.  We did put her up on some buspirone last month but she did not like the side effects of that medication.  So she stopped it and she also stopped her bupropion and stated that she just did not want to be on medications for anxiety and she reports that she is actually doing better off of things and she has not had headaches since going off of these 2 medications as well.  She will stay off of them then and she seems quite happy that she was able to get off of the medications.    Objective: Well-appearing female in no acute distress.  Vital signs as noted.  Chest clear to auscultation.  Heart regular rate and rhythm.  Extremities without edema.  Blood pressure is as noted.

## 2021-05-31 ENCOUNTER — RECORDS - HEALTHEAST (OUTPATIENT)
Dept: ADMINISTRATIVE | Facility: CLINIC | Age: 36
End: 2021-05-31

## 2021-05-31 VITALS — BODY MASS INDEX: 41.3 KG/M2 | HEIGHT: 68 IN

## 2021-05-31 VITALS — BODY MASS INDEX: 23.57 KG/M2 | WEIGHT: 155 LBS

## 2021-06-01 VITALS — BODY MASS INDEX: 42.75 KG/M2 | HEIGHT: 66 IN | WEIGHT: 266 LBS

## 2021-06-02 VITALS — BODY MASS INDEX: 42.9 KG/M2 | WEIGHT: 283.1 LBS | HEIGHT: 68 IN

## 2021-06-02 VITALS — BODY MASS INDEX: 43.87 KG/M2 | WEIGHT: 284.3 LBS

## 2021-06-02 NOTE — PROGRESS NOTES
ASSESSMENT:  1. Essential hypertension, benign    Due to that diastolic still being high on recheck and with her history of having it high when she has been checking at home, I am going to add a little bit of lisinopril 2.5 mg a day to the diuretic that she is already taking.  We will to try to make sure we maximize her treatment of her blood pressure if she indeed is going to think about having the surgery done in the near future.  I will bring her back in about a month to make sure that she is improving with the blood pressure, as well as the fact that she will be checking in with the bariatric clinic as well.    - lisinopril (PRINIVIL,ZESTRIL) 2.5 MG tablet; Take 1 tablet (2.5 mg total) by mouth daily.  Dispense: 30 tablet; Refill: 1    2. Morbid obesity with BMI of 40.0-44.9, adult (H)      3. Need for influenza vaccination    - Influenza,Seasonal,Quad,INJ =/>6months          PLAN:  There are no Patient Instructions on file for this visit.    Orders Placed This Encounter   Procedures     Influenza,Seasonal,Quad,INJ =/>6months     Medications Discontinued During This Encounter   Medication Reason     escitalopram oxalate (LEXAPRO) 5 MG tablet        No follow-ups on file.    CHIEF COMPLAINT:  Chief Complaint   Patient presents with     Hypertension     Follow up       HISTORY OF PRESENT ILLNESS:  Shena is a 33 y.o. female presenting to the clinic today with a check of her blood pressure.  She has noted to be having a bit of high blood pressure the last couple times she has been in here.  She is also dealing with the bariatric clinic and working with Dr. Garcia there.  She is actually planning on actually pursuing the surgery option soon for her obesity.  She is still having high blood pressure readings however and back in and try to get on top of those quickly so that her health can be maximized for when she is going to have the surgery in the next couple of months.  She is having any headaches or vision changes  or blurry vision or double vision, no shortness of breath or chest pain.    REVIEW OF SYSTEMS:     All other systems are negative.    PFSH:    Reviewed      TOBACCO USE:  Social History     Tobacco Use   Smoking Status Never Smoker   Smokeless Tobacco Never Used       VITALS:  Vitals:    10/08/19 1622 10/08/19 1646   BP: (!) 137/96 (!) 152/98   Patient Site: Left Arm Left Arm   Patient Position: Sitting Sitting   Cuff Size: Adult Large Adult Regular   Pulse: 94    SpO2: 98%    Weight: (!) 292 lb 6.4 oz (132.6 kg)      Wt Readings from Last 3 Encounters:   10/08/19 (!) 292 lb 6.4 oz (132.6 kg)   09/13/19 (!) 289 lb (131.1 kg)   07/30/19 (!) 287 lb 1.6 oz (130.2 kg)     Body mass index is 45.12 kg/m .    PHYSICAL EXAM:  Constitutional:  Well appearing patient in no acute distress.  Vitals:  Per nursing notes.    HEENT:  Normocephalic, atraumatic.  Ears are clear bilaterally, with no fluid or redness, and landmarks visible.  Pupils are equal and reactive to light, extraocular muscles intact, visual fields are full.  Nose is normal, and oropharynx is clear without redness.    Neck is without lymphadenopathy.    Lungs:  Clear to auscultation bilaterally without wheezes, rales or rhonchi.   Cardiac:  Regular rate and rhythm without murmurs, rubs, or gallops.     Legs show no cyanosis, clubbing or edema.  Palpation of the distal pulses are normal and symmetric.    Neurologic:  Cranial nerves II-XII intact.   Normal and symmetric reflexes in extremities, with normal strength and sensation.  Psychiatric:  Mood appropriate, memory intact.       MEDICATIONS:  Current Outpatient Medications   Medication Sig Dispense Refill     ergocalciferol (VITAMIN D2) 50,000 unit capsule Take 1 capsule (50,000 Units total) by mouth 3 (three) times a week. 39 capsule 0     hydroCHLOROthiazide (HYDRODIURIL) 25 MG tablet Take 1 tablet (25 mg total) by mouth daily. 90 tablet 3     levonorgestrel (MIRENA) 20 mcg/24 hr (5 years) IUD 1 each by  Intrauterine route once.       lisinopril (PRINIVIL,ZESTRIL) 2.5 MG tablet Take 1 tablet (2.5 mg total) by mouth daily. 30 tablet 1     No current facility-administered medications for this visit.

## 2021-06-03 VITALS
SYSTOLIC BLOOD PRESSURE: 152 MMHG | DIASTOLIC BLOOD PRESSURE: 98 MMHG | WEIGHT: 292.4 LBS | HEART RATE: 94 BPM | OXYGEN SATURATION: 98 % | BODY MASS INDEX: 45.12 KG/M2

## 2021-06-03 VITALS
WEIGHT: 289 LBS | BODY MASS INDEX: 43.8 KG/M2 | HEART RATE: 79 BPM | RESPIRATION RATE: 18 BRPM | SYSTOLIC BLOOD PRESSURE: 131 MMHG | DIASTOLIC BLOOD PRESSURE: 98 MMHG | HEIGHT: 68 IN | OXYGEN SATURATION: 98 %

## 2021-06-03 VITALS — WEIGHT: 293 LBS | HEIGHT: 68 IN | BODY MASS INDEX: 44.41 KG/M2

## 2021-06-03 VITALS
WEIGHT: 293 LBS | SYSTOLIC BLOOD PRESSURE: 126 MMHG | OXYGEN SATURATION: 99 % | HEART RATE: 85 BPM | DIASTOLIC BLOOD PRESSURE: 88 MMHG | BODY MASS INDEX: 45.71 KG/M2

## 2021-06-03 VITALS — WEIGHT: 284.8 LBS | BODY MASS INDEX: 43.95 KG/M2

## 2021-06-03 VITALS — WEIGHT: 287.1 LBS | BODY MASS INDEX: 44.3 KG/M2

## 2021-06-03 VITALS — BODY MASS INDEX: 44.41 KG/M2 | WEIGHT: 293 LBS | HEIGHT: 68 IN
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
## 2021-06-03 NOTE — PROGRESS NOTES
ASSESSMENT:  1. Essential hypertension, benign    Blood pressure is improving now on the medication we added.  She is tolerating the lisinopril very well.  She has no side effects or problems to it and notes that her blood pressure is slowly starting to get better.  She is still hoping to go through the bariatric procedure hopefully early in 2020.  We can watch how her blood pressure response as she starts to lose weight and adjust her medications accordingly.    2. Chronic left-sided low back pain without sciatica    And pain in her lower lumbar area.  I think this is mostly musculoskeletal since there is no radiation down her legs at all.  She is interested in doing some physical therapy which might help with her flexibility  - Ambulatory referral to PT/OT          PLAN:  There are no Patient Instructions on file for this visit.    Orders Placed This Encounter   Procedures     Ambulatory referral to PT/OT     Referral Priority:   Routine     Referral Type:   Physical Therapy or Occupational Therapy     Referral Reason:   Evaluation and Treatment     Requested Specialty:   Physical Therapy     Number of Visits Requested:   1     There are no discontinued medications.    No follow-ups on file.    CHIEF COMPLAINT:  Chief Complaint   Patient presents with     Hypertension     BP med check and low back pain - mainly Lt sided - x 2 years has seen Chiro, [facility], acupuncture, muscle relaxer.        HISTORY OF PRESENT ILLNESS:  Shena is a 33 y.o. female presenting to the clinic today here today for a follow-up of her blood pressure.  We started her on some lisinopril at 2.5 mg a day in addition to her hydrochlorothiazide.  She reports her blood pressures are slowly starting to improve and come down to a better range.  She has no side effects or problems to the medication at all.  She is hopeful that this blood pressure is better here today and she has no chest pain or shortness of breath or numbness or  tingling into her extremities.    She also is having some more issues with lumbar back pain typically in the left side.  She has had this a long time and has seen chiropractors and used ice and is done some therapy in the past.  She is wondering if it is more therapy that she can do or if she can try some heat on the area as well.  She has no pain into the buttocks or down into the lower extremities.  No numbness or tingling into the lower extremities.  No weakness noted.    REVIEW OF SYSTEMS:     All other systems are negative.    PFSH:    Reviewed      TOBACCO USE:  Social History     Tobacco Use   Smoking Status Never Smoker   Smokeless Tobacco Never Used       VITALS:  Vitals:    11/04/19 1214 11/04/19 1229   BP: (!) 130/92 126/88   Patient Site: Left Arm Left Arm   Patient Position: Sitting Sitting   Cuff Size: Adult Large Adult Large   Pulse: 85    SpO2: 99%    Weight: (!) 296 lb 3.2 oz (134.4 kg)      Wt Readings from Last 3 Encounters:   11/04/19 (!) 296 lb 3.2 oz (134.4 kg)   10/08/19 (!) 292 lb 6.4 oz (132.6 kg)   09/13/19 (!) 289 lb (131.1 kg)     Body mass index is 45.71 kg/m .    PHYSICAL EXAM:  Constitutional:  Well appearing patient in no acute distress.  Vitals:  Per nursing notes.    HEENT:  Normocephalic, atraumatic.  Ears are clear bilaterally, with no fluid or redness, and landmarks visible.  Pupils are equal and reactive to light, extraocular muscles intact, visual fields are full.  Nose is normal, and oropharynx is clear without redness.    Neck is without lymphadenopathy.    Lungs:  Clear to auscultation bilaterally without wheezes, rales or rhonchi.   Cardiac:  Regular rate and rhythm without murmurs, rubs, or gallops.     Legs show no cyanosis, clubbing or edema.  Palpation of the distal pulses are normal and symmetric.      Psychiatric:  Mood appropriate, memory intact.     Low back shows some tenderness to palpation of the paraspinal musculature more on the left side of the back.   Decreased range of motion to flexion and extension as well as to lateral flexion worse leaning to the right.  No sciatic notch tenderness.  Negative straight leg raise.  Normal strength sensation reflexes in all 4 extremities.    AMEDICATIONS:  Current Outpatient Medications   Medication Sig Dispense Refill     ergocalciferol (VITAMIN D2) 50,000 unit capsule Take 1 capsule (50,000 Units total) by mouth 3 (three) times a week. 39 capsule 0     hydroCHLOROthiazide (HYDRODIURIL) 25 MG tablet Take 1 tablet (25 mg total) by mouth daily. 90 tablet 3     levonorgestrel (MIRENA) 20 mcg/24 hr (5 years) IUD 1 each by Intrauterine route once.       lisinopril (PRINIVIL,ZESTRIL) 2.5 MG tablet Take 1 tablet (2.5 mg total) by mouth daily. 30 tablet 1     No current facility-administered medications for this visit.

## 2021-06-03 NOTE — PROGRESS NOTES
Health and Behavior Assessment with Intervention, Follow up (60 minutes): Met with patient 1:1 to review support system, psychological testing and mindful eating strategies.  Shena has started to make some changes in her eating and lifestyle but still needs to be more mindful about her eating.  She will follow-up with a list of activities and mindful eating strategies.  Diagnoses: F 41.1; E 66.01

## 2021-06-03 NOTE — PROGRESS NOTES
Initial Structured Weight Loss Supervised Diet Evaluation     Assessment:  Pt. is being seen today for initial RD nutritional evaluation. Pt. has been unsuccessful with non-surgical weight loss methods and is interested in bariatric surgery. Today we reviewed current eating habits and level of physical activity, and instructed on the changes that are required for successful bariatric outcomes.    Surgery of interest per pt: ROGER.    Workflow review:  Support Group: Not completed.  Psychology:In progress.  Lab work:Completed.  SWL:Yes 2nd visit (including visit Bariatrician)     Weight goal: At or below initial.    Patient Active Problem List:  Patient Active Problem List   Diagnosis     Essential hypertension, benign     Generalized anxiety disorder     Morbid obesity with BMI of 40.0-44.9, adult (H)     Low back pain     Urinary incontinence     Vitamin D deficiency     Increased PTH level       Pt's Initial Weight: 289 lbs  Weight: (!) 295 lb 11.2 oz (134.1 kg)  Weight loss from initial: -6.7  % Weight loss: -2.32 %    BMI: Body mass index is 45.63 kg/m .    Estimated RMR ([place]-St Jeor equation): 2090 calories    Food allergies, intolerances, and [place] customs: none     Diabetic: No  HbA1c:    Hemoglobin A1c   Date/Time Value Ref Range Status   09/13/2019 02:48 PM 5.0 4.2 - 6.1 % Final     Vitamins currently taking: none     Biggest struggle with weight loss:bad habits     Diet/Weight History  Method or Diet: Phentermine, Weight Watchers, Books,   # loss(-) or gain(+):50 lb weight loss with Phentermine     Socioeconomic Status:  Who does the grocery shopping for your household? None   Who prepares your meals at home? None     Diet Recall/Time:   Breakfast: none   Am Snack: none   Lunch: (1st meal of the day)-leftovers from the night before (always packs a lunch)   Pm snack:Crackers, Snack Salty Foods   Dinner: hotdish or grilled meats, veggies   HS Snack: none     Overnight eating: No     Per Diet recall  estimated protein: 40 grams    Meals per week away from home: 3-4 times/week (on weekends)     Beverages (Type/Oz. per day)  Water: 64 oz/day +   Alcohol: on special occasions   Other: Peach Tea ([place])-1 glass/day     Exercise  Type: no set regimen, some activity at work  Some back issues-will be starting PT next week     PES statement:   1. (NI-1.3)Excessive energy intake related to Food and nutrition related knowledge deficit concerning excessive energy/oral intake as evidenced by Intake of high caloric density foods/beverages (juice, soda, alcohol) at meals and/or snacks; large portions; skipping meals; Estimated intake that exceeds estimated daily energy intake; Frequent excessive fast food or restaurant intake; and BMI of 45.63 kg/m2.      Intervention    Nutrition Education:   1. Provided general overview of diet and lifestyle modifications needed to be a deemed a safe candidate for bariatric surgery.     Food/Nutrient Delivery:  2. Educated pt on eating three meals, with cutting out snacking.  3. Bariatric Plate: Pt and I discussed the importance of including a lean protein source (20-30 grams/meal), vegetables (included at lunch and dinner), one serving (15g) of carbohydrate, and limited added fat (1 tb/day) at each meal.   4. Discussed importance of adequate hydration after surgery, with goal of at least 64 oz of fluids/day.  5. Addressed avoiding all carbonated, caffeinated and sweetened drinks to prepare for bariatric surgery.     Nutrition Counselin. Mindful eating techniques: Encouraged slow meal pace, chewing foods to applesauce consistency for 20-30 minutes/meal.   7. Discussed  fluids 30 minutes before, during, and after meal to prevent dumping syndrome and discomfort post bariatric surgery.     Instructions/Goals:     1. Keeping fluid separate from meals.   2. Eating slowly (20-30 minutes/meal).     Handouts Provided:   Keys to Success for Bariatric Surgery  Bariatric Plate  Lean  Protein sources      Monitor/Evaluation:  Pt. s target weight: no gain from initial visit, pt. verbalized understanding.     Plan for next visit:   Educate on dumping syndrome and reading food labels.  (Final Supervised Diet visit with RD) pre/post-op  diet progression, give review of surgery process.  Review [place] to Bariatric Success    Visit length: 30 minutes.     ABN signed: Yes

## 2021-06-04 VITALS
HEIGHT: 68 IN | DIASTOLIC BLOOD PRESSURE: 94 MMHG | BODY MASS INDEX: 44.41 KG/M2 | RESPIRATION RATE: 18 BRPM | OXYGEN SATURATION: 98 % | TEMPERATURE: 98.1 F | SYSTOLIC BLOOD PRESSURE: 121 MMHG | WEIGHT: 293 LBS | HEART RATE: 100 BPM

## 2021-06-04 VITALS — BODY MASS INDEX: 45.7 KG/M2 | HEIGHT: 67 IN | WEIGHT: 291.19 LBS

## 2021-06-04 VITALS
WEIGHT: 267.5 LBS | HEART RATE: 88 BPM | SYSTOLIC BLOOD PRESSURE: 108 MMHG | HEIGHT: 67 IN | OXYGEN SATURATION: 99 % | BODY MASS INDEX: 41.99 KG/M2 | DIASTOLIC BLOOD PRESSURE: 74 MMHG

## 2021-06-04 VITALS
BODY MASS INDEX: 44.92 KG/M2 | OXYGEN SATURATION: 98 % | TEMPERATURE: 98.3 F | HEIGHT: 67 IN | HEART RATE: 82 BPM | DIASTOLIC BLOOD PRESSURE: 92 MMHG | WEIGHT: 286.2 LBS | SYSTOLIC BLOOD PRESSURE: 138 MMHG

## 2021-06-04 VITALS — BODY MASS INDEX: 44.41 KG/M2 | HEIGHT: 68 IN | WEIGHT: 293 LBS

## 2021-06-04 VITALS
SYSTOLIC BLOOD PRESSURE: 125 MMHG | DIASTOLIC BLOOD PRESSURE: 92 MMHG | BODY MASS INDEX: 44.41 KG/M2 | HEIGHT: 68 IN | HEART RATE: 79 BPM | WEIGHT: 293 LBS | OXYGEN SATURATION: 98 %

## 2021-06-04 VITALS — BODY MASS INDEX: 45.37 KG/M2 | WEIGHT: 293 LBS

## 2021-06-04 VITALS — BODY MASS INDEX: 44.22 KG/M2 | WEIGHT: 291.8 LBS | HEIGHT: 68 IN

## 2021-06-04 NOTE — TELEPHONE ENCOUNTER
Sinus pain and congestion started Sunday    Will check temp    Breathing ok    No severe pain    No ear pain    Not a lot of cough    Home treatments given.    Francesca King, RN   [facility] Medication Refill and Triage Nurse  8:25 AM  1/2/2020      Reason for Disposition    Sinus congestion as part of a cold, present < 10 days    Protocols used: SINUS PAIN AND CONGESTION-A-[place]

## 2021-06-04 NOTE — TELEPHONE ENCOUNTER
Refill Approved    Rx renewed per Medication Renewal Policy. Medication was last renewed on 10/8/19.    Shena Fontanez, [facility] Triage/Med Refill 12/13/2019     Requested Prescriptions   Pending Prescriptions Disp Refills     lisinopril (PRINIVIL,ZESTRIL) 2.5 MG tablet [Pharmacy Med Name: LISINOPRIL 2.5MG TABLETS] 30 tablet 0     Sig: TAKE 1 TABLET(2.5 MG) BY MOUTH DAILY       [facility]ce Inhibitors Refill Protocol Passed - 12/12/2019  2:16 PM        Passed - PCP or prescribing provider visit in past 12 months       Last office visit with prescriber/PCP: 11/4/2019 Danilo Gonzalez MD OR same dept: 11/4/2019 Danilo Gonzalez MD OR same specialty: 11/4/2019 Danilo Gonzalez MD  Last physical: 4/19/2019 Last MTM visit: Visit date not found   Next visit within 3 mo: Visit date not found  Next physical within 3 mo: Visit date not found  Prescriber OR PCP: Danilo Gonzalez MD  Last diagnosis associated with med order: 1. Essential hypertension, benign  - lisinopril (PRINIVIL,ZESTRIL) 2.5 MG tablet [Pharmacy Med Name: LISINOPRIL 2.5MG TABLETS]; TAKE 1 TABLET(2.5 MG) BY MOUTH DAILY  Dispense: 30 tablet; Refill: 0    If protocol passes may refill for 12 months if within 3 months of last provider visit (or a total of 15 months).             Passed - Serum Potassium in past 12 months     Lab Results   Component Value Date    Potassium 3.9 05/20/2019             Passed - Blood pressure filed in past 12 months     BP Readings from Last 1 Encounters:   11/04/19 126/88             Passed - Serum Creatinine in past 12 months     Creatinine   Date Value Ref Range Status   05/20/2019 0.76 0.60 - 1.10 mg/dL Final

## 2021-06-04 NOTE — PROGRESS NOTES
Follow Up Surgical Weight Loss Supervised Diet Evaluation    Assessment:  Pt. is being seen today for a follow up RD nutritional evaluation. Pt. has been unsuccessful with non-surgical weight loss methods and is interested in bariatric surgery. Today we reviewed current eating habits and level of physical activity, and instructed on the changes that are required for successful bariatric outcomes.    Surgery of interest per pt: RNY.    Workflow review:  Support Group: Not completed.  Unable to make the last one, hectic schedule.   Psychology:In progress. Next Dec 30th   Lab work:Completed.  SWL:Yes 3rd Visit (including Bariatrician Visit)     Weight goal: At or below initial.    Patient Active Problem List:  Patient Active Problem List   Diagnosis     Essential hypertension, benign     Generalized anxiety disorder     Morbid obesity with BMI of 40.0-44.9, adult (H)     Low back pain     Urinary incontinence     Vitamin D deficiency     Increased PTH level       Pt's Initial Weight: 289 lbs  Weight: (!) 295 lb 4.8 oz (133.9 kg)  Weight loss from initial: -6.3  % Weight loss: -2.18 %    BMI: Body mass index is 45.57 kg/m .    Estimated RMR ([place]-St Jeor equation): 2088 calories    Progress over past month: No drinking before and after meals, waiting 30 minutes.  Incorporating more veggies into the diet.     Diabetic: No  HbA1c:    Hemoglobin A1c   Date/Time Value Ref Range Status   09/13/2019 02:48 PM 5.0 4.2 - 6.1 % Final     Diet Recall/Time:   Breakfast: granola bars or skipping   Am Snack: none   Lunch: Lasagna or Chicken and Broccoli or Beef Sausage and Potatoes   Pm snack: none   Dinner: same as lunches (she meal preps enough for both lunch and supper)   HS Snack: occasionally-cookies or goldfish crackers     Overnight eating: No     Per Diet recall estimated protein: 40 grams    Meals per week away from home: 2 times/week (sit down restaurant)     Meal duration: at least 20 minutes.     Beverages (Type/Oz.  per day)  Water: at least 64 oz/day   Coffee: 1 cup/day  Alcohol: rarely   Other: Peach Tea      fluids by 30 minutes before, during meal, and waiting 30 minutes after meal before drinking fluids: Yes    Exercise  Type: Starting PT, however no set regimen    PES statement:   1. (NI-1.3)Excessive energy intake related to Food and nutrition related knowledge deficit concerning excessive energy/oral intake as evidenced large portions; Estimated intake that exceeds estimated daily energy intake; Frequent excessive fast food or restaurant intake; and BMI of 45.57 kg/m2.      Intervention    Nutrition Education:   1. Provided general overview of diet and lifestyle modifications needed to be a deemed a safe candidate for bariatric surgery.   2. Educated pt on how to read a food label: choosing foods with than 10 grams fat and 10 grams sugar per serving to avoid dumping syndrome.  3.   Discussed portion sizes and utilization of smaller plate at meal times to help control overall portion sizes.     Food/Nutrient Delivery:  3. Educated pt on eating three meals, with cutting out snacking.  4. Bariatric Plate: Pt and I discussed the importance of including a lean protein source (20-30 grams/meal), vegetables (included at lunch and dinner), one serving (15g) of carbohydrate, and limited added fat (1 tb/day) at each meal.    5. Educated pt on using a protein powder drink as a meal replacement and/or supplement after bariatric surgery.   6. Discussed importance of adequate hydration after surgery, with goal of at least 64 oz of fluids/day.    Nutrition Counselin. Mindful eating techniques: Encouraged slow meal pace, chewing foods to applesauce consistency for 20-30 minutes/meal.   8. Discussed  fluids 30 minutes before, during, and after meal to prevent dumping syndrome and discomfort post bariatric surgery.     Instructions/Goals:     1. Utilize smaller plate (salad size) to help with overall portion  control at lunch and supper.   2. Start protein supplement as a meal replacement to help with consistency of breakfast daily.    Handouts Provided:   No handouts provided this visit.       Monitor/Evaluation:  Pt. s target weight: no gain from initial visit, pt. verbalized understanding.     Plan for next visit:   Educate on dumping syndrome and reading food labels.  (Final Supervised Diet visit with RD) pre/post-op  diet progression, give review of surgery process.  Review [place] to Bariatric Success    Visit length: 30 minutes.     ABN signed: Yes

## 2021-06-05 NOTE — TELEPHONE ENCOUNTER
"RN triage from patient  She is reporting that on 12/3/19 she had a replacement IUD put in.  She reports since then that she has had nausea day and night. She states she had some \"weird bleeding\" as well.  She had the IUD removed yesterday at her OB/GYN and was told if the IUD was causing the nausea the relief should \"be immediate\"  Ruled out pregnancy at OB/GYN only thing found was a 3 mm cyst.  Patient states that the nausea continues same as before. Only has been eating \"bits and pieces\"  The last 2 weeks the nausea became more severe and has not been able to eat a full meal.   Urine is dark yellow, maybe urinates 2-3 times in 24 hours. Fluid intake is 8 oz a day, restricting because of nausea.  Has had a cold at beginning of year and coughing hard can cause lightheadedness but otherwise denies this or trouble breathing.  Gave disposition to be seen in ED now. Patient was agreeable and will go to [facility].  Lavonne Fraser, RN  [facility] Triage Nurse  4:21 PM  1/9/2020          Reason for Disposition    [1] Drinking very little AND [2] dehydration suspected (e.g., no urine > 12 hours, very dry mouth, very lightheaded)    Protocols used: NAUSEA-A-AH      "

## 2021-06-05 NOTE — PROGRESS NOTES
Follow Up Surgical Weight Loss Supervised Diet Evaluation    Assessment:  Pt. is being seen today for a follow up RD nutritional evaluation. Pt. has been unsuccessful with non-surgical weight loss methods and is interested in bariatric surgery. Today we reviewed current eating habits and level of physical activity, and instructed on the changes that are required for successful bariatric outcomes.    Surgery of interest per pt: ROGER.    Workflow review:  Support Group: Plans on attending this Tuesday 1/14. .  Psychology:In progress.  Lab work:Completed.  SWL:Yes 4th Visit      Weight goal: At or below initial.    Patient Active Problem List:  Patient Active Problem List   Diagnosis     Essential hypertension, benign     Generalized anxiety disorder     Morbid obesity with BMI of 40.0-44.9, adult (H)     Low back pain     Urinary incontinence     Vitamin D deficiency     Increased PTH level       Pt's Initial Weight: 289 lbs  Weight: (!) 291 lb 12.8 oz (132.4 kg)  Weight loss from initial: -2.8  % Weight loss: -0.97 %    BMI: Body mass index is 45.03 kg/m .    Estimated RMR (Hampton-St Jeor equation): 2072 calories    Progress over past month: fluid intake has been down recently due to being ill over the holidays, usually consumes plenty of water throughout the day.  Patient reports that recently she has been only consuming about 8 oz/day of water, noting that everything his making her nauseated.  Patient did report that she was recently prescribed some zofran as well as medications for acid reflux, which she is starting today.  Otherwise, patient notes that she has been more consistent with eating breakfast.  Trying to incorporate more veggies and fruit into the diet.  Paying attention to portion sizes.      Diabetic: No  HbA1c:    Hemoglobin A1c   Date/Time Value Ref Range Status   09/13/2019 02:48 PM 5.0 4.2 - 6.1 % Final     Diet Recall/Time:   Breakfast: cottage cheese or yogurt   Am Snack: none   Lunch: hasn't  been eating well lately due to being ill (nausea and battling a cold)   Pm snack:none   Dinner: chicken with veggies   HS Snack: none     Overnight eating: No     Per Diet recall estimated protein: 35-40 grams    Meals per week away from home: 3 times/week (however less more recently)     Meal duration: 20-30 minutes.      fluids by 30 minutes before, during meal, and waiting 30 minutes after meal before drinking fluids: Yes (feels that she has achieved this goal and is doing well).      Exercise  Type: Basketball   Frequency (days/week): 1 day/week   Duration (minutes/day): 60 minutes     PES statement:   1. (NI-1.3)Excessive energy intake related to Food and nutrition related knowledge deficit concerning excessive energy/oral intake as evidenced by large portions; Estimated intake that exceeds estimated daily energy intake; Frequent excessive fast food or restaurant intake; and BMI of 45.03 kg/m2.      Intervention    Nutrition Education:   1. Provided general overview of diet and lifestyle modifications needed to be a deemed a safe candidate for bariatric surgery.   2. Educated pt on how to read a food label: choosing foods with than 10 grams fat and 10 grams sugar per serving to avoid dumping syndrome.  3. Dumping Syndrome: Described the mechanisms of syndrome, symptoms, and prevention tools from a dietary perspective.     Food/Nutrient Delivery:  4. Bariatric Plate: Pt and I discussed the importance of including a lean protein source (20-30 grams/meal), vegetables (included at lunch and dinner), one serving (15g) of carbohydrate, and limited added fat (1 tb/day) at each meal.   5. Educated pt on using a protein powder drink as a meal replacement and/or supplement after bariatric surgery.   6. Discussed importance of adequate hydration after surgery, with goal of at least 64 oz of fluids/day.  7. Addressed avoiding all carbonated, caffeinated and sweetened drinks to prepare for bariatric surgery.      Nutrition Counselin. Mindful eating techniques: Encouraged slow meal pace, chewing foods to applesauce consistency for 20-30 minutes/meal.   9. Discussed  fluids 30 minutes before, during, and after meal to prevent dumping syndrome and discomfort post bariatric surgery.     Instructions/Goals:     1. Focus on getting enough fluid in throughout the day to help prevent dehydration, consuming at least 64 oz/day.     Handouts Provided:   Dumping Syndrome     Monitor/Evaluation:  Pt. s target weight: no gain from initial visit, pt. verbalized understanding.     Plan for next visit:   Review current intake of fluids throughout the day.   (Final Supervised Diet visit with RD) pre/post-op  diet progression, give review of surgery process.  Review [place] to Bariatric Success    Visit length: 30 minutes.     ABN signed: Yes
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
## 2021-06-05 NOTE — PATIENT INSTRUCTIONS - HE
Prednisone 40mg daily for 3-5 days for the throat swelling, may just take 20mg daily if higher dose not tolerated  Azithromycin as directed  Continue daily acid blocker  Recheck in 2 weeks if still hoarse for possible ENT evaluation, sooner if needed.

## 2021-06-05 NOTE — PROGRESS NOTES
Assessment/Plan:   Laryngitis  Persistent laryngitis for 3 weeks. No trauma or strain. Some ST and cough. Consider viral vs atypical bacterial process, GERD, PND, vocal cord dysfunction, nodules or polyps or mass on vocal cords, neurologic process.   I think most likely inflammatory. We will treat with prednisone and azithromycin for mycoplasma. Continue acid blocker.   If persists needs to see ENT for further evaluation/visualization.   I discussed red flag symptoms, return precautions to clinic/ER and follow up care with patient/parent.  Expected clinical course, symptomatic cares advised. Questions answered. Patient/parent amenable with plan.  - predniSONE (DELTASONE) 20 MG tablet; 40mg daily for 3-5 days.  Dispense: 10 tablet; Refill: 0  - azithromycin (ZITHROMAX Z-LING) 250 MG tablet; 2 tabs (500 mg ) day #1, then 1 tab (250 mg) days #2-5, total 5 days  Dispense: 6 tablet; Refill: 0    Prednisone 40mg daily for 3-5 days for the throat swelling, may just take 20mg daily if higher dose not tolerated  Azithromycin as directed  Continue daily acid blocker  Recheck in 2 weeks if still hoarse for possible ENT evaluation, sooner if needed.     Subjective:      Shena Chamorro is a 34 y.o. female who presents with persistent laryngitis. On 12/27/19 she lost her voice and it has remained raspy ever since. It seemed like a cold at the onset - she had ST and a little cough initially but no runny nose. There is still soreness around the larynx when she strains to speak louder. She has an occasional cough, nonproductive, no wheeze, no shortness of breath. She has a sensation of needing to clear her throat. No post nasal drainage, no fever, no URI or sinus congestion. She denies GERD - takes an acid blocker. No known exposures. No voice trauma or strain that may have injured the vocal cords.   She feels well, no loss of appetite or weight loss, no trouble with swallow, no HA.  Never smoked.     No Known  "Allergies    Current Outpatient Medications on File Prior to Visit   Medication Sig Dispense Refill     hydroCHLOROthiazide (HYDRODIURIL) 25 MG tablet Take 1 tablet (25 mg total) by mouth daily. 90 tablet 3     lisinopril (PRINIVIL,ZESTRIL) 2.5 MG tablet TAKE 1 TABLET(2.5 MG) BY MOUTH DAILY 90 tablet 3     pantoprazole (PROTONIX) 20 MG tablet Take 1 tablet (20 mg total) by mouth daily. 20 tablet 0     levonorgestrel (MIRENA) 20 mcg/24 hr (5 years) IUD 1 each by Intrauterine route once.       sucralfate (CARAFATE) 1 gram tablet Take 1 tablet (1 g total) by mouth 4 (four) times a day. 20 tablet 0     No current facility-administered medications on file prior to visit.      Patient Active Problem List   Diagnosis     Essential hypertension, benign     Generalized anxiety disorder     Morbid obesity with BMI of 40.0-44.9, adult (H)     Low back pain     Urinary incontinence     Vitamin D deficiency     Increased PTH level       Objective:     BP (!) 121/94 (Patient Site: Right Arm, Patient Position: Sitting, Cuff Size: Adult Large)   Pulse 100   Temp 98.1  F (36.7  C) (Oral)   Resp 18   Ht 5' 7.5\" (1.715 m)   Wt (!) 302 lb 8 oz (137.2 kg)   SpO2 98%   BMI 46.68 kg/m      Physical  General Appearance: Alert, cooperative, no distress, AVSS  Head: Normocephalic, without obvious abnormality, atraumatic  Eyes: Conjunctivae are normal.   Ears: Normal T's and external ear canals, both ears  Nose: No significant congestion.  Throat: Throat is normal.  No exudate.  No significant lesions  Neck: No adenopathy, thyroid not tender, nodular, enlarged  Lungs: Clear to auscultation bilaterally, respirations unlabored  Heart: Regular rate and rhythm  Skin:  no rashes or lesions  Psychiatric: Patient has a normal mood and affect.          "

## 2021-06-06 NOTE — TELEPHONE ENCOUNTER
03/13/20        Patient's Provider:  Anayeli Kaye       Appointment type missed:  [facility]    Outcome: Left message for patient to call [phone] to reschedule missed appointment.

## 2021-06-06 NOTE — PROGRESS NOTES
Follow Up Surgical Weight Loss Supervised Diet Evaluation    Assessment:  Pt. is being seen today for a follow up RD nutritional evaluation. Pt. has been unsuccessful with non-surgical weight loss methods and is interested in bariatric surgery. Today we reviewed current eating habits and level of physical activity, and instructed on the changes that are required for successful bariatric outcomes.    Surgery of interest per pt: ROGER.    Workflow review:  Support Group: Not completed.  Psychology:Completed.  Lab work:Completed.   SWL:Yes 5th       Weight goal: At or below initial.    Patient Active Problem List:  Patient Active Problem List   Diagnosis     Essential hypertension, benign     Generalized anxiety disorder     Morbid obesity with BMI of 40.0-44.9, adult (H)     Low back pain     Urinary incontinence     Vitamin D deficiency     Increased PTH level       Pt's Initial Weight: 289 lbs  Weight: (!) 294 lb 1.6 oz (133.4 kg)  Weight loss from initial: -5.1  % Weight loss: -1.76 %    BMI: Body mass index is 45.38 kg/m .    Estimated RMR ([place]-St Jeor equation): 2082 calories    Progress over past month: Was on vacation for 9 days this past month, which she feels that being on vacation, she definitely feels that this is where the weight gain has come from.  Patient reports that since then she has gotten back on track with diet and especially fluid intake.     Diabetic: No  HbA1c:    Hemoglobin A1c   Date/Time Value Ref Range Status   09/13/2019 02:48 PM 5.0 4.2 - 6.1 % Final     Diet Recall/Time:   Breakfast: Cottage Cheese and Greek Yogurt with a small amount of granola or 2 eggs   Am Snack: none   Lunch: chicken and veggies   Pm snack:none   Dinner: chicken and veggies or spaghetti   HS Snack: none     Overnight eating: No     Per Diet recall estimated protein: 60+ grams    Meals per week away from home: 0-1 time/week     Meal duration: 15-20 minutes.     Beverages (Type/Oz. per day)  Water: at least 64  oz/day   Coffee: 1 time/week   Other: Occasional Tea at [employer] 0-1 time/week      fluids by 30 minutes before, during meal, and waiting 30 minutes after meal before drinking fluids: Yes    Exercise  Type: Recreation Sports (T Ball, Basketball)   Frequency (days/week):  1 time/week   Duration (minutes/day): 20 minutes   Tracking steps on a daily basis, up to 7000 steps/day (on average)     PES statement:   1. (NI-1.3)Excessive energy intake related to Food and nutrition related knowledge deficit concerning excessive energy/oral intake as evidenced by h/o large portions; Estimated intake that exceeds estimated daily energy intake; lack of an exercise regimen; and BMI of 45.38 kg/m2.      Intervention    Nutrition Education:   1. Provided general overview of diet and lifestyle modifications needed to be a deemed a safe candidate for bariatric surgery.   2. Educated pt on how to read a food label: choosing foods with than 10 grams fat and 10 grams sugar per serving to avoid dumping syndrome.    Food/Nutrient Delivery:  3. Educated pt on eating three meals, with cutting out snacking.  4. Bariatric Plate: Pt and I discussed the importance of including a lean protein source (20-30 grams/meal), vegetables (included at lunch and dinner), one serving (15g) of carbohydrate, and limited added fat (1 tb/day) at each meal.   5. Educated pt on how to complete a food journal and benefits of meal planning.   6. Educated pt on using a protein powder drink as a meal replacement and/or supplement after bariatric surgery.   7. Discussed importance of adequate hydration after surgery, with goal of at least 64 oz of fluids/day.  8. Addressed avoiding all carbonated, caffeinated and sweetened drinks to prepare for bariatric surgery.     Nutrition Counselin. Mindful eating techniques: Encouraged slow meal pace, chewing foods to applesauce consistency for 20-30 minutes/meal.   10. Discussed  fluids 30 minutes  before, during, and after meal to prevent dumping syndrome and discomfort post bariatric surgery.       Instructions/Goals:     1. Consider replacing breakfast with a meal replacement to help promote weight loss.   2. Continue to work on eating slowly (20-30 minutes/meal) and chewing food well.   3. Keep fluid separate from meals (30 minute rule)     Handouts Provided:   Pt. [facility][note 3] Bariatric Care Patient Handbook    Monitor/Evaluation:  Pt. s target weight:  no gain from initial visit, pt. verbalized understanding.     Plan for next visit:   (Final Supervised Diet visit with RD) pre/post-op  diet progression, give review of surgery process.  Review [place] to Bariatric Success    Visit length: 30 minutes.     ABN signed: Yes

## 2021-06-07 NOTE — PROGRESS NOTES
I have submitted a prior authorization request on behalf of this patient to [employer] to be approved for a LSG with Dr. Lj Simpson

## 2021-06-07 NOTE — PROGRESS NOTES
"Shena Chamorro is a 34 y.o. female who is being evaluated via a billable video visit.      The patient has been notified of following:     \"This video visit will be conducted via a call between you and your physician/provider. We have found that certain health care needs can be provided without the need for an in-person physical exam.  This service lets us provide the care you need with a video conversation.  If a prescription is necessary we can send it directly to your pharmacy.  If lab work is needed we can place an order for that and you can then stop by our lab to have the test done at a later time.    Video visits are billed at different rates depending on your insurance coverage. Please reach out to your insurance provider with any questions.    If during the course of the call the physician/provider feels a video visit is not appropriate, you will not be charged for this service.\"    Patient has given verbal consent to a Video visit? Yes    Patient would like to receive their AVS by AVS Preference: Michael.    Patient would like the video invitation sent by: Text to cell phone: [phone]    Will anyone else be joining your video visit? No        Video Start Time:   12:27      "

## 2021-06-07 NOTE — TELEPHONE ENCOUNTER
I reached out to Shena this afternoon to let her know that she not only needs to attend a support group but will need to finish the online seminar.  I emailed her the link and she will finish it.  She completed her [employer] Phone calls on 03/10/20.  Should be ready after that

## 2021-06-07 NOTE — PROGRESS NOTES
HPI: Shena Chamorro is a 34 y.o. female here today for consideration of metabolic and bariatric surgery. She is referred by Dr. Danilo Gonzalez.  She has struggled with obesity since after high school. She was quite athletic but after high school began gaining weight. She has tried multiple weight loss programs including phentermine. She was able to lose weight with this but did not like the side effects. She quit this and regained the weight. She has had a history of reflux when she was pregnant but this has pretty much resolved. She is currently on two different medications for hypertension. She has a 5 year old son and wants to be able to be active with him and be healthier.        Allergies:Patient has no known allergies.    Past Medical History:   Diagnosis Date     Anxiety      Generalized anxiety disorder 4/19/2019     Hypertension 2014    gestastional     Low back pain     [place] [employer] [facility] [employer]     Morbid obesity with BMI of 40.0-44.9, adult (H)      Seizures (H)     one time febrile seizure at 19     Urinary incontinence        Past Surgical History:   Procedure Laterality Date     COLONOSCOPY  2015    having bleeding     LASIK       WISDOM TOOTH EXTRACTION         CURRENT MEDS:  Current Outpatient Medications   Medication Sig Dispense Refill     hydroCHLOROthiazide (HYDRODIURIL) 25 MG tablet Take 1 tablet (25 mg total) by mouth daily. 90 tablet 3     levonorgestrel (MIRENA) 20 mcg/24 hr (5 years) IUD 1 each by Intrauterine route once.       lisinopril (PRINIVIL,ZESTRIL) 2.5 MG tablet TAKE 1 TABLET(2.5 MG) BY MOUTH DAILY 90 tablet 3     pantoprazole (PROTONIX) 20 MG tablet Take 1 tablet (20 mg total) by mouth daily. 20 tablet 0     predniSONE (DELTASONE) 20 MG tablet 40mg daily for 3-5 days. 10 tablet 0     sucralfate (CARAFATE) 1 gram tablet Take 1 tablet (1 g total) by mouth 4 (four) times a day. 20 tablet 0     No current facility-administered medications for this visit.          Family History   Problem  Relation Age of Onset     Alcohol abuse Mother      Drug abuse Mother      Diabetes Maternal Grandmother      Hypertension Maternal Grandmother      Cancer Maternal Grandmother         lung     Heart disease Maternal Grandfather         MI     Alcohol abuse Father      Depression Sister      Nephrolithiasis Sister         reports that she has never smoked. She has never used smokeless tobacco. She reports that she does not drink alcohol or use drugs.    Review of Systems -  A 12 point ROS was reviewed and except for what is listed in the HPI above, all others are negative  PSYCHIATRIC: She has undergone a lifestyle assessment and has been deemed a good candidate for bariatric surgery by the psychologist.    Wt (!) 294 lb (133.4 kg)   BMI 45.37 kg/m    Wt Readings from Last 3 Encounters:   04/23/20 (!) 294 lb (133.4 kg)   02/14/20 (!) 294 lb 1.6 oz (133.4 kg)   01/17/20 (!) 302 lb 8 oz (137.2 kg)     Body mass index is 45.37 kg/m .      LABS:  Lab Results   Component Value Date    WBC 10.0 09/13/2019    HGB 14.6 09/13/2019    HCT 43.0 09/13/2019    MCV 85 09/13/2019     09/13/2019     INR/Prothrombin Time      Lab Results   Component Value Date    HGBA1C 5.0 09/13/2019     Lab Results   Component Value Date    ALT 27 09/13/2019    AST 22 09/13/2019    ALKPHOS 67 09/13/2019    BILITOT 0.3 09/13/2019       Assessment/Plan: 34 y.o. female who is an excellent candidate for bariatric and metabolic surgery.  I went over both the sleeve gastrectomy and Bob Y gastric bypass with her. After discussing both she is most interested in the Sleeve. I told her I thought this was a good option for her.      I went over the surgery in detail with her.  I went over the nature of the operation and some of the potential consequences of the surgery.  I went over the expected hospital course and discussed laparoscopic versus open surgery, understanding that we will plan on doing this laparoscopically with the possibility of  "having to convert to an open operation.  I went over some of the risks and complications of the operation including, but not limited to, DVT, pulmonary emboli, pneumonia, postoperative bleeding, wound infection, staple line leak, intra-abdominal sepsis, and possible death.  I also went over some of the potential nutritional concerns such as vitamin B-12, iron, vitamin D, vitamin A, calcium and protein deficiencies.  I will also went over the need for lifelong nutritional surveillance.  The patient understands and wants to proceed with surgery.  We will submit for prior authorization.      I also offered her a visit in person prior to any surgery as we are in the midst of the Coronavirus pandemic and it is not clear when we will be able to resume Bariatric Surgery. This was a telephone meeting and I offered the patient to meet with me in person prior to her surgery. I spent 30\" in counseling and this was the majority of this visit.     Lj Simpson MD  [facility] Department of Surgery  "

## 2021-06-07 NOTE — PROGRESS NOTES
"Shena Chamorro is a 34 y.o. female who is being evaluated via a billable telephone visit.      The patient has been notified of following:     \"This telephone visit will be conducted via a call between you and your physician/provider. We have found that certain health care needs can be provided without the need for a physical exam.  This service lets us provide the care you need with a short phone conversation.  If a prescription is necessary we can send it directly to your pharmacy.  If lab work is needed we can place an order for that and you can then stop by our lab to have the test done at a later time.    If during the course of the call the physician/provider feels a telephone visit is not appropriate, you will not be charged for this service.\"     Shena Chamorro complains of  No chief complaint on file.      I have reviewed and updated the patient's Past Medical History, Social History, Family History and Medication List.    ALLERGIES  Patient has no known allergies.    Additional provider notes:     Follow Up Surgical Weight Loss Supervised Diet Evaluation    Assessment:  Pt. is being seen today for a follow up RD nutritional evaluation. Pt. has been unsuccessful with non-surgical weight loss methods and is interested in bariatric surgery. Today we reviewed current eating habits and level of physical activity, and instructed on the changes that are required for successful bariatric outcomes.    Surgery of interest per pt: RNY.    Workflow review:  Support Group: Not completed.  Psychology:Completed.  Lab work:Completed.  SWL:Yes 6th Visit     Weight goal: At or below initial.    Patient Active Problem List:  Patient Active Problem List   Diagnosis     Essential hypertension, benign     Generalized anxiety disorder     Morbid obesity with BMI of 40.0-44.9, adult (H)     Low back pain     Urinary incontinence     Vitamin D deficiency     Increased PTH level       Pt's No data recorded  BMI: There is no " height or weight on file to calculate BMI.   Current Weight (per pt's report): 291 lbs   BMI: 45 kg/m2 (based on reported weight and height)     Estimated RMR ([place]-St Jeor equation): 2068 calories    Progress over past month: Has been utilizing [employer] to help manage weight loss (has started ~ 3 weeks ago)     Diabetic: Yes  HbA1c:    Hemoglobin A1c   Date/Time Value Ref Range Status   09/13/2019 02:48 PM 5.0 4.2 - 6.1 % Final     Diet Recall/Time:   Breakfast: [employer] Shake   Am Snack: none   Lunch: Cottage Cheese or Parfait with oats or Progresso Soups   Pm snack:none   Dinner: grilled chicken with veggies   HS Snack: none     Overnight eating: No     Per Diet recall estimated protein: 55-60 grams    Meals per week away from home: 0-1 time/week     Meal duration:  20-30 minutes.     Beverages (Type/Oz. per day)  Water: 6 glasses/day   Coffee: completely off (has eliminated)      fluids by 30 minutes before, during meal, and waiting 30 minutes after meal before drinking fluids: Yes    Exercise  Type: Staying active with cleaning (outside and inside), moving as much as possible     PES statement:   1. Overweight/Obesity (NC 3.3) related to overeating and poor lifestyle habits as evidenced by patient's subjective statements (such as h/o excessive energy intake, portion control, lack of physical activity) and BMI of 45 kg/m2.     Intervention    Nutrition Education:   1. Provided general overview of diet and lifestyle modifications needed to be a deemed a safe candidate for bariatric surgery.   2. Vitamins: Educated on post-op vitamin regimen including MVI+ 18 mg Fe two times a day, calcium citrate 400-600 mg two times a day, 3430-8611 mcg sublingual B12 daily, 5000 IU vitamin D3 daily.     Food/Nutrient Delivery:  3. Educated pt on eating three meals, with cutting out snacking.  4. Bariatric Plate: Pt and I discussed the importance of including a lean protein source (20-30 grams/meal), vegetables  (included at lunch and dinner), one serving (15g) of carbohydrate, and limited added fat (1 tb/day) at each meal.   5. Educated pt on using a protein powder drink as a meal replacement and/or supplement after bariatric surgery.   6. Discussed importance of adequate hydration after surgery, with goal of at least 64 oz of fluids/day.  7. Addressed avoiding all carbonated, caffeinated and sweetened drinks to prepare for bariatric surgery.     Nutrition Counselin. Mindful eating techniques: Encouraged slow meal pace, chewing foods to applesauce consistency for 20-30 minutes/meal.   9. Discussed  fluids 30 minutes before, during, and after meal to prevent dumping syndrome and discomfort post bariatric surgery.   10. Discussed pre/post operative diet progression, post op vitamin regimen, gave review of surgery process.       Instructions/Goals:     1. Continue to work on post-op eating techniques.    2. Continue to focus on protein first and as the main entree.     Handouts Provided:   Pt. [facility] Bariatric Care Patient Handbook      Monitor/Evaluation:  Pt. s target weight:  no gain from initial visit, pt. verbalized understanding.     Pt has completed all nutrition requirements and is well-informed of the dietary and physical activity requirements that are necessary for successful bariatric outcomes. This pt is an appropriate candidate for surgery from a nutrition standpoint at this time. The patient understands that surgery is a tool, not a cure, and post operative follow up is essential.  Patient is however going to continue to follow-up with Dietitian monthly until elective surgeries resume, so that she can stay accountable.     Plan for next visit:   Monthly Check Ins for Accountability    Assessment/Plan:  1. Essential hypertension, benign      2. Obesity, Class III, BMI 40-49.9 (morbid obesity) (H)      3. Nutritional counseling        Phone call duration: 30 minutes    Anayeli ENCINAS  [place], RD

## 2021-06-07 NOTE — PROGRESS NOTES
Workflow updated. Per 's note, he would like to see the patient face to face when possible prior to surgery.    Mary Verdugo RN, CBN  [facility]  P [phone]  F [phone]

## 2021-06-07 NOTE — TELEPHONE ENCOUNTER
Reached out to patient and offered a video or telephone visit. Patient declined at this time, and stated she will call back and schedule a video or telephone visit if she feels the need, otherwise she will wait to be seen in clinic. Paulette Levi

## 2021-06-08 NOTE — PROGRESS NOTES
ASSESSMENT:  1. Essential hypertension, benign    Blood pressure was a bit better on recheck, and she does seem a bit anxious today so we will keep an eye on that going forward.  She is also obviously looking forward to her surgery and she is hopeful that losing weight will help her manage her blood pressure better also.    2. Generalized anxiety disorder    She is stable on this as well and is hopeful that the new job will be something that she can focus on and help her with her anxiety.    3. Morbid obesity with BMI of 40.0-44.9, adult (H)    Again, she is working with the bariatric folks looking forward to surgery but that is been delayed now almost till September which she is rather disappointed about, but still working on exercise and trying to keep her weight down but finds it difficult when she is working from home a lot.    I see no reason why she could not work full-time without restrictions and I will write her a note for her new job and have it in my chart for her.    PLAN:  There are no Patient Instructions on file for this visit.    No orders of the defined types were placed in this encounter.    Medications Discontinued During This Encounter   Medication Reason     predniSONE (DELTASONE) 20 MG tablet        No follow-ups on file.    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       HISTORY OF PRESENT ILLNESS:  Shena is a 34 y.o. 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  They wanted her to come in and had not be seen by a physician and get checked to make sure she does not have any communicable diseases or has any conditions that would require restrictions on her work.  She thinks she is doing quite well and has no need for restrictions at this point.  Obviously after she has surgery she may need some temporary restrictions, but that will be for several months  "yet.    Her blood pressure is been fairly good when she has been checking it.  She has been having a tough time losing weight because she is working from home and finds it difficult to stay out of the fridge during the day.  She has also not exercise quite as much as she would like to be doing.    Her anxiety has been a little higher because of the whole coronavirus issue and she has been trying to work through that but does not feel the need to change any of her medications at this point.    REVIEW OF SYSTEMS:     All other systems are negative.    PFSH:    Reviewed      TOBACCO USE:  Social History     Tobacco Use   Smoking Status Never Smoker   Smokeless Tobacco Never Used       VITALS:  Vitals:    05/27/20 1000 05/27/20 1018   BP: (!) 138/95 (!) 125/92   Patient Site: Right Arm Right Arm   Patient Position: Sitting Sitting   Cuff Size: Adult Large Adult Large   Pulse: 79    SpO2: 98%    Weight: (!) 296 lb 6.4 oz (134.4 kg)    Height: 5' 7.5\" (1.715 m)      Wt Readings from Last 3 Encounters:   05/27/20 (!) 296 lb 6.4 oz (134.4 kg)   04/23/20 (!) 294 lb (133.4 kg)   02/14/20 (!) 294 lb 1.6 oz (133.4 kg)     Body mass index is 45.74 kg/m .    PHYSICAL EXAM:  Constitutional:  Well appearing patient in no acute distress.  Vitals:  Per nursing notes.    HEENT:  Normocephalic, atraumatic.  Ears are clear bilaterally, with no fluid or redness, and landmarks visible.  Pupils are equal and reactive to light, extraocular muscles intact, visual fields are full.  Nose is normal, and oropharynx is clear without redness.    Neck is without lymphadenopathy.    Lungs:  Clear to auscultation bilaterally without wheezes, rales or rhonchi.   Cardiac:  Regular rate and rhythm without murmurs, rubs, or gallops.     Legs show no cyanosis, clubbing or edema.  Palpation of the distal pulses are normal and symmetric.    Neurologic:  Cranial nerves II-XII intact.   Normal and symmetric reflexes in extremities, with normal strength and " sensation.  Psychiatric:  Mood appropriate, memory intact.           MEDICATIONS:  Current Outpatient Medications   Medication Sig Dispense Refill     hydroCHLOROthiazide (HYDRODIURIL) 25 MG tablet Take 1 tablet (25 mg total) by mouth daily. 90 tablet 3     levonorgestrel (MIRENA) 20 mcg/24 hr (5 years) IUD 1 each by Intrauterine route once.       lisinopril (PRINIVIL,ZESTRIL) 2.5 MG tablet TAKE 1 TABLET(2.5 MG) BY MOUTH DAILY 90 tablet 3     pantoprazole (PROTONIX) 20 MG tablet Take 1 tablet (20 mg total) by mouth daily. 20 tablet 0     sucralfate (CARAFATE) 1 gram tablet Take 1 tablet (1 g total) by mouth 4 (four) times a day. 20 tablet 0     No current facility-administered medications for this visit.

## 2021-06-08 NOTE — TELEPHONE ENCOUNTER
Prefer F/F, but I suppose a video could work.    Paperwork?  Does she need a COVID test?  Labs?    TS

## 2021-06-09 NOTE — ANESTHESIA POSTPROCEDURE EVALUATION
Patient: Shena Chamorro  Procedure(s):  Laparoscopic Sleeve Gastrectomy  Anesthesia type: general    Patient location: PACU  Last vitals:   Vitals Value Taken Time   /82 7/15/2020 11:07 AM   Temp 37  C (98.6  F) 7/15/2020 11:07 AM   Pulse 72 7/15/2020 11:07 AM   Resp 20 7/15/2020 11:07 AM   SpO2 98 % 7/15/2020 11:07 AM     Post vital signs: stable  Level of consciousness: awake and responds to simple questions  Post-anesthesia pain: pain controlled  Post-anesthesia nausea and vomiting: no  Pulmonary: unassisted, return to baseline  Cardiovascular: stable and blood pressure at baseline  Hydration: adequate  Anesthetic events: no    QCDR Measures:  ASA# 11 - Smita-op Cardiac Arrest: ASA11B - Patient did NOT experience unanticipated cardiac arrest  ASA# 12 - Smita-op Mortality Rate: ASA12B - Patient did NOT die  ASA# 13 - PACU Re-Intubation Rate: ASA13B - Patient did NOT require a new airway mgmt  ASA# 10 - Composite Anes Safety: ASA10A - No serious adverse event    Additional Notes:

## 2021-06-09 NOTE — PROGRESS NOTES
Time in: 1:00p /Time out: 2:00p   Pt's No data recorded  BMI: There is no height or weight on file to calculate BMI.    Pt presents for nutrition education class for liquid diets. Educated pt on 2-week pre-op and 1-week post-op liquid diets. Discussed appropriate liquids and demonstrated portions for each of the food groupings during each diet phase. Reviewed appropriate calories/protein/fluid goals during 2-week pre-op liquid diet. Educated on correct vitamins/minerals to take after surgery in correct dosage and frequency. Provided grocery list and sample menu plan for each diet stage, as well as unflavored protein powder samples.        Pt will begin 2-week pre-op liquid diet 7-1-20, will do clear liquids the day before surgery. Pt is scheduled for LSG on 7-15-20, and will then follow 2 week post-op liquid diet. Pt will f/u with RD 1-week post-op for further diet advancement.

## 2021-06-09 NOTE — ANESTHESIA PREPROCEDURE EVALUATION
Anesthesia Evaluation      Patient summary reviewed   No history of anesthetic complications     Airway   Mallampati: II  Neck ROM: full   Pulmonary - negative ROS and normal exam                          Cardiovascular - normal exam  Exercise tolerance: > or = 4 METS  (+) hypertension, ,     ECG reviewed        Neuro/Psych    (+) seizures (one time febrile at 20 yo), anxiety/panic attacks,     Endo/Other    (+) obesity ( BMI over 40),      GI/Hepatic/Renal - negative ROS           Dental - normal exam                        Anesthesia Plan  Planned anesthetic: general endotracheal  ERAS protocol, limit IVF accordingly    ITN duramorph for post operative pain control per surgeon's request    Minimize narcotics  Magnesium  Precedex  Ketamine 50 mg  Decadron 10 mg    Reverse with Sugammadex    Propofol background  Zofran  Scopolamine patch    Sugammadex information handout was distributed.      ASA 3   Induction: intravenous   Anesthetic plan and risks discussed with: patient  Anesthesia plan special considerations: antiemetics,   Post-op plan: routine recovery

## 2021-06-09 NOTE — PROGRESS NOTES
Preoperative Exam    Scheduled Procedure: Sleeve Gastrectomy, Dr. Castro[facility], EKG AND CXR ORDERED BY [facility]ATRIC CLINIC ()  Surgery Date:  7/15/20   Surgery Location: [facility], fax [phone]    Surgeon:  Dr. Castro    Assessment/Plan:     1. Preop general physical exam    The patient is approved for the indicated surgery, with the proper anesthesia.  Patient should have no problems with this and I would expect a normal recovery.  Follow-up in their primary care clinic with any primary care needs after the surgery.  The necessary labs and tests were done today and reviewed by me.     - Electrocardiogram Perform and Read    2. Essential hypertension, benign    This was a bit high today but on recheck was better, would assume that they will check this before her surgery.    3. Morbid obesity with BMI of 40.0-44.9, adult (H)      4. Generalized anxiety disorder      5. Chronic left-sided low back pain without sciatica      6. Pre-op testing    - HM1(CBC and Differential)  - Comprehensive Metabolic Panel  - Urinalysis-UC if Indicated  - APTT(PTT)  - INR  - HM1 (CBC with Diff)        Surgical Procedure Risk: Low (reported cardiac risk generally < 1%)  Have you had prior anesthesia?: Yes  Have you or any family members had a previous anesthesia reaction:  No  Do you or any family members have a history of a clotting or bleeding disorder?: No  Cardiac Risk Assessment: no increased risk for major cardiac complications    APPROVAL GIVEN to proceed with proposed procedure, without further diagnostic evaluation        Functional Status: Independent  Patient plans to recover at home with family.     Subjective:      Shena Chamorro is a 34 y.o. female who presents for a preoperative consultation.  She is having the gastric surgery to be done in a couple of days here by my esteemed colleague Dr. Lj Simpson.  She has been through the nonsurgical path in our bariatric  clinic and without any success, she qualifies for having the procedure done.  She is very excited and anticipating the procedure and is looking forward to it.    She has the other chronic issues along with this including back pain and her morbid obesity as well as fatigue and hypertension which is actually pretty good today.    All other systems reviewed and are negative, other than those listed in the HPI.    Pertinent History  Do you have difficulty breathing or chest pain after walking up a flight of stairs: No  History of obstructive sleep apnea: No  Steroid use in the last 6 months: No  Frequent Aspirin/NSAID use: No  Prior Blood Transfusion: No  Prior Blood Transfusion Reaction: No  If for some reason prior to, during or after the procedure, if it is medically indicated, would you be willing to have a blood transfusion?:  There is no transfusion refusal.    Current Outpatient Medications   Medication Sig Dispense Refill     cyanocobalamin, vitamin B-12, 1,000 mcg Subl Place 1,000 mcg under the tongue daily.       hydroCHLOROthiazide (HYDRODIURIL) 25 MG tablet TAKE 1 TABLET(25 MG) BY MOUTH DAILY 90 tablet 3     levonorgestrel (MIRENA) 20 mcg/24 hr (5 years) IUD 1 each by Intrauterine route once.       lisinopril (PRINIVIL,ZESTRIL) 2.5 MG tablet TAKE 1 TABLET(2.5 MG) BY MOUTH DAILY 90 tablet 3     pediatric multivitamin (FLINTSTONES) Chew chewable tablet Chew 1 tablet 2 (two) times a day.       sucralfate (CARAFATE) 1 gram tablet Take 1 tablet (1 g total) by mouth 4 (four) times a day. 20 tablet 0     [START ON 7/29/2020] ursodioL (ACTIGALL) 300 mg capsule Take 1 capsule (300 mg total) by mouth 2 (two) times a day. Start 2 wks after surgery. Do not cut, crush or open. Take with warm liquids. 180 capsule 1     No current facility-administered medications for this visit.         No Known Allergies    Patient Active Problem List   Diagnosis     Essential hypertension, benign     Generalized anxiety disorder      Morbid obesity with BMI of 40.0-44.9, adult (H)     Low back pain     Urinary incontinence     Vitamin D deficiency     Increased PTH level     Body mass index (BMI) of 45.0-49.9 in adult (H)     HTN (hypertension)     Morbid obesity (H)       Past Medical History:   Diagnosis Date     Anxiety      Generalized anxiety disorder 4/19/2019     Hypertension 2014    gestastional     Low back pain     [place] [facility]     Morbid obesity with BMI of 40.0-44.9, adult (H)      Seizures (H)     one time febrile seizure at 19     Urinary incontinence        Past Surgical History:   Procedure Laterality Date     COLONOSCOPY  2015    having bleeding     LASIK       WISDOM TOOTH EXTRACTION         Social History     Socioeconomic History     Marital status: Single     Spouse name: Not on file     Number of children: Not on file     Years of education: Not on file     Highest education level: Not on file   Occupational History     Not on file   Social Needs     Financial resource strain: Not on file     Food insecurity     Worry: Not on file     Inability: Not on file     Transportation needs     Medical: Not on file     Non-medical: Not on file   Tobacco Use     Smoking status: Never Smoker     Smokeless tobacco: Never Used   Substance and Sexual Activity     Alcohol use: No     Comment: rare/social occasions.     Drug use: No     Sexual activity: Yes     Partners: Male     Birth control/protection: I.U.D.     Comment: 3.6 yo child   Lifestyle     Physical activity     Days per week: Not on file     Minutes per session: Not on file     Stress: Not on file   Relationships     Social connections     Talks on phone: Not on file     Gets together: Not on file     Attends [employer] service: Not on file     Active member of club or organization: Not on file     Attends meetings of clubs or organizations: Not on file     Relationship status: Not on file     Intimate partner violence     Fear of current or ex partner: Not on file      "Emotionally abused: Not on file     Physically abused: Not on file     Forced sexual activity: Not on file   Other Topics Concern     Not on file   Social History Narrative    \"\" (boyfriend) 1 son.    Working FT [employer]       Patient Care Team:  Danilo Gonzalez MD as PCP - General (Family Medicine)  Danilo Gonzalez MD as Assigned PCP          Objective:     Vitals:    07/13/20 0842 07/13/20 1236   BP: (!) 138/106 (!) 138/92   Pulse: 82    Temp: 98.3  F (36.8  C)    SpO2: 98%    Weight: (!) 286 lb 3.2 oz (129.8 kg)    Height: 5' 7.25\" (1.708 m)          Physical Exam:    General: Awake, Alert and Cooperative   Head: Normocephalic and Atraumatic   Eyes: PERRL, EOMI.   ENT: Normal pearly TMs bilaterally and Oropharynx clear   Neck: Supple and Thyroid without enlargement or nodules   Chest: Chest wall normal   Lungs: Clear to auscultation bilaterally   Heart:: Regular rate and rhythm and no murmurs.  No significant LE edema.   Abdomen: Soft, nontender, nondistended and no hepatosplenomegaly   Musculoskeletal: Moving all extremities and No pain in the extremities   Neuro: Alert and oriented times 3 and Grossly normal   Skin: No rashes or lesions noted        There are no Patient Instructions on file for this visit.    EKG: Normal sinus rhythm with no acute or hyperacute changes.    Labs:  Labs pending at this time.  Results will be reviewed when available.    Immunization History   Administered Date(s) Administered     Influenza,seasonal quad, PF, =/> 6months 09/25/2014     Influenza,seasonal,quad inj =/> 6months 10/05/2015, 10/06/2017, 11/09/2018, 10/08/2019     Tdap 07/07/2014           Electronically signed by Danilo Gonzalez MD 07/13/20 8:46 AM  "

## 2021-06-09 NOTE — PROGRESS NOTES
Time in: 10:00a   /Time out: 11:00a      Pt presents for 1 week post op dietitian follow up. Reports tolerating full liquids well. Denies n/v, constipation/diarrhea, or significant pain. Taking MVI and SL B12 appropriately. Taking 20-30 oz fluid/day and 1/2 protein shake. Educated pt on pureed and soft to bariatric regular diets. Provided grocery list and sample meal plan for each diet stage.  Pt will begin pureed diet on 7-30-20 and advance as tolerated to softs/bariatric regular on 8-20-20. Instructed pt to begin 500 mg calcium citrate BID and 5000IU Vitamin D3 on 8-20-20. Pt to follow up with RD at 3 months post op.

## 2021-06-09 NOTE — TELEPHONE ENCOUNTER
Called pt to schedule surgery. She's on 7/15 7:30am Chava for Rui Bob. Will send info thru [facility] also as she is active.     This encounter is missing a charge-could you add please?  maren

## 2021-06-09 NOTE — ANESTHESIA CARE TRANSFER NOTE
Last vitals:   Vitals:    07/15/20 0859   BP: 121/72   Pulse: 83   Resp: 21   Temp: 37.1  C (98.7  F)   SpO2: 100%     Patient's level of consciousness is drowsy  Spontaneous respirations: yes  Maintains airway independently: yes  Dentition unchanged: yes  Oropharynx: oropharynx clear of all foreign objects    QCDR Measures:  ASA# 20 - Surgical Safety Checklist: WHO surgical safety checklist completed prior to induction    PQRS# 430 - Adult PONV Prevention: 4558F - Pt received => 2 anti-emetic agents (different classes) preop & intraop  ASA# 8 - Peds PONV Prevention: NA - Not pediatric patient, not GA or 2 or more risk factors NOT present  PQRS# 424 - Smita-op Temp Management: 4559F - At least one body temp DOCUMENTED => 35.5C or 95.9F within required timeframe  PQRS# 426 - PACU Transfer Protocol: - Transfer of care checklist used  ASA# 14 - Acute Post-op Pain: ASA14B - Patient did NOT experience pain >= 7 out of 10

## 2021-06-09 NOTE — TELEPHONE ENCOUNTER
Wiliam Chatterjee,     I just left you a voicemail but thought I'd follow up by email. When I scheduled your surgery I mistakenly selected Bob-en-Y Gastric Bypass. However, you are actually having the Laparoscopic Sleeve Gastrectomy as discussed with Dr Simpson and prior authorized by your insurance.    No other changes, I just wanted to make sure you knew this was corrected. Let me know if you have any questions.    Have a good weekend!  Wilberto Dam[facility]     [facility]  Surgery Clinic [facility]  Weight Management Clinic [facility]  [address] [place] [address] [address]  [place] [address]

## 2021-06-09 NOTE — TELEPHONE ENCOUNTER
Post-Op Phone Call  [facility]    Surgeon: Lj Simpson M.D.  Date of Surgery: 7/15/2020  Discharge Date: 7/16/2020    Date/Time Called:   Date: 7/20/2020 Time: 3:27 PM   Attempt: First    Patient unavailable, message left to call [facility] with questions/problems? Yes    Call completed by:   Mary Verdugo RN, CBN  [facility]  P [phone]  F [phone]

## 2021-06-09 NOTE — TELEPHONE ENCOUNTER
Last office visit 5-27-20. Last dispensed 6-25-19. Patient has upcoming appointment on 7-13-20 with Dr. Gonzalez.

## 2021-06-09 NOTE — ANESTHESIA PROCEDURE NOTES
Spinal Block    Patient location during procedure: pre-op  Start time: 7/15/2020 7:05 AM  End time: 7/15/2020 7:10 AM  Reason for block: at surgeon's request and post-op pain management    Staffing:  Performing  Anesthesiologist: Shayla Covington MD    Preanesthetic Checklist  Completed: patient identified, risks, benefits, and alternatives discussed, timeout performed, consent obtained, airway assessed, oxygen available, suction available, emergency drugs available and hand hygiene performed  Spinal Block  Patient position: sitting  Prep: ChloraPrep and site prepped and draped  Patient monitoring: heart rate, cardiac monitor, continuous pulse ox and blood pressure  Approach: midline  Location: L2-3  Injection technique: single-shot  Needle type: pencil-tip   Needle gauge: 24 G      Additional Notes:  ITN duramorph for post operative pain

## 2021-06-09 NOTE — PROGRESS NOTES
"Hospital Follow-up Visit:    Assessment/Plan:     1. Morbid obesity with BMI of 40.0-44.9, adult (H)    2. S/P laparoscopic sleeve gastrectomy    She seems to be doing much better in the last couple of days.  She is quite happy with her progress so far.  She knows it will not be an easy road ahead but she is really optimistic and very hopeful.  The wounds look good and I think she can is follow-up with Dr. Simpson virtually next week.  She will let us know if she needs anything going forward.           Subjective:     Shena Chamorro is a 34 y.o. female who presents for a hospital discharge follow up.      Hospital/Nursing Home/IP Rehab Facility: [facility]  Date of Admission: 7/15/20  Date of Discharge:7/16/20  Reason(s) for Admission:surgery            Do you have any problems taking your medication regularly?  None       Have you had any changes in your medication since discharge? None       Have you had any difficulty following your discharge or treatment plan?  No    Summary of hospitalization:  Hospital discharge summary reviewed  Diagnostic Tests/Treatments reviewed.  Follow up needed: None  Other Healthcare Providers Involved in Patient's Care: Patient Care Team:  Danilo Gonzalez MD as PCP - General (Family Medicine)  Danilo Gonzalez MD as Assigned PCP      Update since discharge: {improved     Post Discharge Medication Reconciliation: Post Discharge Medication Reconciliation Status: discharge medications reconciled, continue medications without change  Plan of care communicated with: patient     Objective:     Vitals:    07/23/20 1100   BP: 108/74   Pulse: 88   SpO2: 99%   Weight: (!) 267 lb 8 oz (121.3 kg)   Height: 5' 6.5\" (1.689 m)         Physical Exam:    General: Awake, Alert and Cooperative   Head: Normocephalic and Atraumatic   Eyes: PERRL, EOMI.   ENT: Normal pearly TMs bilaterally and Oropharynx clear   Neck: Supple and Thyroid without enlargement or nodules   Chest: Chest wall normal "   Lungs: Clear to auscultation bilaterally   Heart:: Regular rate and rhythm and no murmurs.  No significant LE edema.   Abdomen: Soft, nontender, nondistended and no hepatosplenomegaly   Musculoskeletal: Moving all extremities and No pain in the extremities   Neuro: Alert and oriented times 3 and Grossly normal   Skin: No rashes or lesions noted          Coding guidelines for this visit:  Type of Medical   Decision Making Face-to-Face Visit       within 7 Days of discharge Face-to-Face Visit        within 14 days of discharge   Moderate Complexity [id number]   High Complexity [id number]       Electronically signed by Danilo Gonzalez MD 07/23/20 1:13 PM

## 2021-06-09 NOTE — PROGRESS NOTES
Patient attended virtual group class to review pre-op instructions for upcoming surgery.    Discussed admission process, COVID restrictions and hospital course.  Pharmacy information packet given and explained. Patient was given exercises to work on post-op for maintaining muscle mass and strengthening.  Bariatric quiz given to pt for review on their own. Discharge instructions, information card and follow up appointments given and reviewed with pt at this time and patient verbalized understanding. She will have her H&P at [facility] on 7/13/2020 and do her pre-op testing at that visit.  Prescriptions for Omeprazole and Actigall sent to the patient's pharmacy to be started after surgery.      Mary Verdugo RN, CBN  [facility]  P [phone]  F [phone]

## 2021-06-09 NOTE — TELEPHONE ENCOUNTER
Pt called to confirm tomorrow's 'class' is virtual. Will she be getting some instructions on logging into Teams or however it's being held?    [phone]  **OK to leave detailed VM**

## 2021-06-10 NOTE — PROGRESS NOTES
"Shena Chamorro is a 34 y.o. female who is being evaluated via a billable video visit.      The patient has been notified of following:     \"This video visit will be conducted via a call between you and your physician/provider. We have found that certain health care needs can be provided without the need for an in-person physical exam.  This service lets us provide the care you need with a video conversation.  If a prescription is necessary we can send it directly to your pharmacy.  If lab work is needed we can place an order for that and you can then stop by our lab to have the test done at a later time.    Video visits are billed at different rates depending on your insurance coverage. Please reach out to your insurance provider with any questions.    If during the course of the call the physician/provider feels a video visit is not appropriate, you will not be charged for this service.\"    Patient has given verbal consent to a Video visit? Yes  How would you like to obtain your AVS? AVS Preference: E-Mail (Inform patient AVS not encrypted with this option).  If dropped by the video visit, the video invitation should be sent to: Text to cell phone: [phone]  Will anyone else be joining your video visit? No        Video Start Time: 10:32 AM    Additional provider notes:   HPI: Pt is here for follow up of a sleeve gastrectomy. She is OK. Still having some challenges getting full liquids down. No vomiting but feels nauseated after small amounts. No fevers or chills. Ambulating without problems.     Current Outpatient Medications   Medication Sig Dispense Refill     cyanocobalamin, vitamin B-12, 1,000 mcg Subl Place 1 tablet (1,000 mcg total) under the tongue daily. 90 tablet 3     levonorgestrel (MIRENA) 20 mcg/24 hr (5 years) IUD 1 each by Intrauterine route once.       lisinopril (PRINIVIL,ZESTRIL) 2.5 MG tablet TAKE 1 TABLET(2.5 MG) BY MOUTH DAILY 90 tablet 3     omeprazole (PRILOSEC) 20 MG capsule Take 1 " capsule (20 mg total) by mouth daily before breakfast. 90 capsule 0     pediatric multivitamin (FLINTSTONES) Chew chewable tablet Chew 2 tablets daily.        metoclopramide (REGLAN) 10 MG tablet Take 1 tablet (10 mg total) by mouth 4 (four) times a day with meals and bedtime. 24 tablet 0     [START ON 7/29/2020] ursodioL (ACTIGALL) 300 mg capsule Take 1 capsule (300 mg total) by mouth 2 (two) times a day. Start 2 wks after surgery. Do not cut, crush or open. Take with warm liquids. 180 capsule 1     No current facility-administered medications for this visit.          There were no vitals taken for this visit.  Wt Readings from Last 3 Encounters:   07/23/20 (!) 267 lb 8 oz (121.3 kg)   07/15/20 (!) 291 lb 3 oz (132.1 kg)   07/13/20 (!) 286 lb 3.2 oz (129.8 kg)     There is no height or weight on file to calculate BMI.    EXAM:  GENERAL:Appears well  ABDOMEN: Incisions healing well      Assessment/Plan: Pt s/p sleeve gastrectomy. Will try Reglan to help with nausea. Diet and activity discussed. She will contact us if the Reglan is not helping. She will f/u with us at the Bariatric Center in 3 months.    Lj Simpson MD  [facility] Department of Surgery      Video-Visit Details    Type of service:  Video Visit    Video End Time (time video stopped): 1025  Originating Location (pt. Location): Home    Distant Location (provider location):  [facility]     Platform used for Video Visit: Arjun Simpson MD

## 2021-06-12 NOTE — TELEPHONE ENCOUNTER
RN cannot approve Refill Request    RN can NOT refill this medication med is not covered by policy/route to provider. Last office visit: 1/17/2020 Lidya Anne MD Last Physical: Visit date not found Last MTM visit: Visit date not found Last visit same specialty: 1/17/2020 Lidya Anne MD.  Next visit within 3 mo: Visit date not found  Next physical within 3 mo: Visit date not found      Radha Bustamante, [facility] Triage/Med Refill 10/14/2020    Requested Prescriptions   Pending Prescriptions Disp Refills     predniSONE (DELTASONE) 20 MG tablet [Pharmacy Med Name: PREDNISONE 20MG TABLETS] 10 tablet 0     Sig: TAKE 2 TABLETS BY MOUTH DAILY FOR 3 TO 5 DAYS.       There is no refill protocol information for this order

## 2021-06-13 NOTE — PROGRESS NOTES
Patient complaints of severe constipation and has tried increasing her water and movement.  Also been doing Miralax and stool softners,and smooth Move Tea without results.  She is asking for a prescription for an enema and suppository.  She can tell it is close to her rectum but large and hard.  Wanda Alanis RN

## 2021-06-14 NOTE — TELEPHONE ENCOUNTER
Central PA team  [phone]  Pool: [facility] PA MED ([id number])          PA has been initiated.       PA form completed and faxed insurance via Cover My Meds     Key:  [id number]     Medication:  LINZESS 145MCG    Insurance:  [employer]        Response will be received via fax and may take up to 5-10 business days depending on plan

## 2021-06-14 NOTE — TELEPHONE ENCOUNTER
We received a fax from the pharmacy that a PA is needed for Linzess 145mcg Capsules that Dr. Gonzalez prescribed 1/19/21      Please initiate PA.     RICHMOND BarrosA

## 2021-06-14 NOTE — TELEPHONE ENCOUNTER
Received refill request for Prednisone from pharmacy, but on medication it shows Pt has request for this med to be removed from her list.

## 2021-06-14 NOTE — TELEPHONE ENCOUNTER
LMTCB  - please give message below to pt regarding Prior Auth and ask if she could call [facility] to update her insurance information if it has changed for this Prior Auth for Linzess.  Thank You

## 2021-06-15 NOTE — TELEPHONE ENCOUNTER
Called pt and gave message to update her insurance with [employer] and ask them to re-run the Linzess through the new insurance to see of a PA is actually needed. Pt will call [employer] and let us know if anything more is needed.

## 2021-06-15 NOTE — TELEPHONE ENCOUNTER
Pt called and states ALPRAZolam (XANAX) 0.25 MG tablet was sent to wrong pharmacy and needs it sent to  [address]  [phone]    Ok to leave detailed message.    Nava Lezama

## 2021-06-16 PROBLEM — E56.9 VITAMIN DEFICIENCY: Status: ACTIVE | Noted: 2019-09-29

## 2021-06-16 PROBLEM — F41.1 GENERALIZED ANXIETY DISORDER: Status: ACTIVE | Noted: 2019-04-19

## 2021-06-16 PROBLEM — Z98.84 S/P LAPAROSCOPIC SLEEVE GASTRECTOMY: Status: ACTIVE | Noted: 2020-07-15

## 2021-06-16 PROBLEM — R79.89 INCREASED PTH LEVEL: Status: ACTIVE | Noted: 2019-09-29

## 2021-06-16 PROBLEM — E66.01 MORBID OBESITY (H): Status: ACTIVE | Noted: 2020-06-25

## 2021-06-16 PROBLEM — I10 HTN (HYPERTENSION): Status: ACTIVE | Noted: 2020-06-25

## 2021-06-16 PROBLEM — I10 ESSENTIAL HYPERTENSION, BENIGN: Status: ACTIVE | Noted: 2019-04-19

## 2021-06-17 NOTE — TELEPHONE ENCOUNTER
Telephone Encounter by Maisha Paz at 2/2/2021  4:33 PM     Author: Maisha Paz Service: -- Author Type: --    Filed: 2/2/2021  4:42 PM Encounter Date: 2/1/2021 Status: Addendum    : Maisha Paz    Related Notes: Original Note by Maisha Paz filed at 2/2/2021  4:41 PM       Per [employer] insurance as of 1/31/2021 patient is no longer covered under this plan.   Called pharmacy to see if patient has any updated insurance information. Advised me to call the [facility] in [phone] that is where patient normally fills her medications.   Pharmacy states that the [employer] insurance with ID# [id number] is the only insurance they have as well. No PA can be done at this time.     Pharmacy advised patient to call with updated insurance information or call her current insurance if this is an error.   If patient has new insurance the pharmacy will process the claim to see if it needs a PA and will contact clinic with PA information.

## 2021-06-17 NOTE — TELEPHONE ENCOUNTER
Controlled Substance Refill Request  Medication Name:   Requested Prescriptions     Pending Prescriptions Disp Refills     ALPRAZolam (XANAX) 0.25 MG tablet 30 tablet 0     Sig: Take 1 tablet (0.25 mg total) by mouth 3 (three) times a day as needed for anxiety.     Date Last Fill: 2/12/2021  [facility]ed [facility]  Submit electronically to pharmacy  Controlled Substance Agreement on file:   Encounter-Level CSA Scan Date:    There are no encounter-level csa scan date.        Last office visit:  Discussed with PCP on 2/12/2021 My Chart encounter.          Saray Tuttle RN PHN [place], MAN Nurse Educator 5/24/2021 4:13 PM

## 2021-06-17 NOTE — PROGRESS NOTES
I have submitted a referral on behalf of this patient to [facility] to participate in their call to change program for surgical weight loss.  I have left a message for her to call me regarding this and to follow up with her after her visit with Dr. Alcocer to answer any questions she may have and to go over her requirements before having surgery.

## 2021-06-21 NOTE — LETTER
Letter by Danilo Gonzalez MD at      Author: Danilo Gonzalez MD Service: -- Author Type: --    Filed:  Encounter Date: 2021 Status: (Other)       To whom it may concern:    I am the primary care provider for Ms Shena Chamorro ( 1985).  She is looking to have her dog with her most of the time as an emotional support animal.  As her physician, I would strongly endorse this possibility.  She has generalized anxiety disorder (ICD 10 code F41.1) and she has done so much better with this since getting her dog.  She has not done well with certain medications and would like to avoid some of the stronger medications that are used at times for this issue.  Having the dog with her has made her more calm and focused, and she feels much better now.  This dog would help her control her anxiety and help her live a much better and happier life.    If I can be of further assistance, please do not hesitate to contact my office.        Sincerely,      Danilo Gonzalez MD  [facility]

## 2021-06-25 NOTE — TELEPHONE ENCOUNTER
Controlled Substance Refill Request  Medication Name:   Requested Prescriptions     Pending Prescriptions Disp Refills     ALPRAZolam (XANAX) 0.25 MG tablet [Pharmacy Med Name: ALPRAZOLAM 0.25MG TABLETS] 30 tablet 0     Sig: TAKE 1 TABLET BY MOUTH THREE TIMES DAILY AS NEEDED FOR ANXIETY     Date Last Fill: 2/12/21  [facility]ed [facility]  Submit electronically to pharmacy  Controlled Substance Agreement on file:   Encounter-Level CSA Scan Date:    There are no encounter-level csa scan date.        Last office visit:  7/23/20  Marlin Drake RN, MA  [facility]    Triage Nurse Advisor

## 2021-06-26 NOTE — PROGRESS NOTES
Progress Notes by Chaim Alcocer MD at 4/9/2018 12:30 PM     Author: Chaim Alcocer MD Service: -- Author Type: Physician    Filed: 4/9/2018  1:51 PM Encounter Date: 4/9/2018 Status: Signed    : Chaim Alcocer MD (Physician)       Outpatient Bariatric Medicine Consultation on 4/9/2018, 1:10 PM.    Indication: Medical Bariatric Consultation to Precede Bariatric Surgery  Primary Provider: Jelly Asencio MD  Requesting Physician: TBJEANNA  Type of Bariatric Surgery: TBD     Impression Shena Chamorro is a 32 y.o. year old female with  has a past medical history of Hypertension (2014) and Seizures.  Poor functional capacity and musculoskeletal disability due to morbid obesity which satisfies NIH criteria for bariatric surgery. Her  Body mass index is 42.61 kg/(m^2)..     She has had some modest success with phentermine use in the past with a 30-40 lb weight reduction but did not have durable maintenance and couldn't tolerate the stimulating mood swings caused by phentermine (shortened her temper severely).     Her seizure listed on past history sounds like a one time episode at age 19 that never recurred and reportedly was evaluated fully. Whether this was syncope or a true seizure is unclear.  No ongoing sx or family hx.    She did request some weight loss medication to get a head start on her surgical weight loss and given her BMI of 42, qualifies. We'll avoid phentermine given her mood issues with it and try bupropion/naltrexone.  I'll recheck with her in a month to make sure she's tolerating it and having efficacy.    She was referred to our clinic by self referral.  Shena Chamorro hopes to achieve improved health and be available and an example for her son after bariatric surgery.    PLAN of ACTION:  1.  1.  Welcome to the surgical program. Please view the online seminar and finish the quiz.  2. Intake labs and referrals sent today.  3. Check with Roma Ball on structured weight loss needs.  4.  Trial of bupropion and naltrexone to jump start your weight loss. Recheck with me in 3-5 weeks. Stop if not tolerating and anticipate stopping the weight loss meds about 3 weeks prior to surgery.  5. Update your papsmear.  6. Anticipate 6 months of actigall post op.  7. Attend one support group meeting and sign in.      BARIATRIC RECOMMENDATIONS  Bariatric therapy is indicated for Shena as a means of modifying her obesity related co-morbidities.  Therapeutic lifestyle changes have not lead to significant and or durable weight loss.  Surgical bariatric therapy is most likely to induce significant weight loss, promote long-term weight maintenance and lead to clinical improvement and/or resolution of her weight related co-morbidities.   -Medical Nutrition Therapy is indicated including comprehensive guidance of nutrition and lifestyle management.  -A Bariatric Psychological Assessment and clearance is indicated.  -Screening Bariatric Laboratory studies are indicated.  -Currency of Routine Healthcare Maintenance is indicated.  -Physical Activity Guidance was provided. Follow up of recommendations will be provided.    PERIOPERATIVE RECOMMENDATIONS  CARDIAC: Cardiac consultation and clearance will be required of patients with significant cardiac disease and/or multiple cardiac risk factors.  PULMONARY: Pre-operative therapy with CPAP/BIPAP is indicated for a minimum of one month for patients with sleep apnea. Complete tobacco abstinence for two months pre-operatively and indefinitely thereafter is required.   RENAL: Diuretics will be discontinued 2 weeks before surgery at the time of liquid diet if the patient is on them at that time.  ENDOCRINE: Optimizing perioperative glycemic control is indicated. Our goal is an AIC of 8 or less at the time of surgery for optimal healing. Patients using insulin will be referred to a [facility] endocrinologist for perioperative insulin management.  GASTROINTESTINAL: Evaluation of  the esophagus and stomach by EGD and/or UGI series will be considered in patients with severe GERD, previous weight loss surgery, or other indication.  GYNECOLOGIC: For patients on Estrogen- Estrogen will be discontinued 4 weeks prior to surgery and resumed 4 weeks after surgery unless otherwise advised. Reliable contraception is required post-operatively for 1 year for women of childbearing age. DEPOT PROVERA is contraindicated due to its association with weight gain. Post-operative birth control plan is IUD  MUSCULOSKELETAL/RHEUMATOLOGIC: NSAIDS are contraindicated following surgery and lifelong abstinence is indicated. When indicated for cardioprotection or otherwise, patients should use an enteric coated ASA and concomitant proton pump inhibitor.  HEMATOLOGIC: Risks of deep venous thromboembolism have been assessed. Patients with history of DVT/PE or current anti-coagulation will be placed on the High Risk DVT Prophylaxis Protocol. Objections (if any) to receiving blood products if necessary have been documented.  DENTAL: Reasonable and functional dentition is indicated in order to chew food to applesauce consistency post-operatively.  NUTRITIONAL: Pre and post-operative nutritional and lifestyle modification guidance is indicated. Pre-operative weight reduction can reduce liver volume, improving technical aspects of surgery.    History Surrounding Consultation  Struggles with weight started at age after HS.  Her weight at age 19 was  Unsure.  She has had several past supervised and unsupervised weight loss attempts  The most weight lost was: 35 lbs w/ phentermine, last used in 2017.  Unfortunately there was not durable weight maintenance.  History of bulimia, anorexia, or binge eating disorder? no  If Present has eating disorder been in remission at least 3 years? na  Night time eating? no    Dietary History  Meals per day: 2, tends to skip breakfast.  Snacks: none  Typical Snack: none currently due to  "challenge she's on. At work would have cheezeits. [employer] .  Who does the grocery shopping? She does all.   Who does the cooking? She does  A typical meal includes: dinner is chicken breast, rice, mac and cheese.  Regular Pop: not much. Sweet teas one daily.  Juice: no  Caffeine: cup daily in a.m.   Amount of restaurant eating per week: once weekly.  Eating a the table with the TV off? Yes, TV on     Physical Activity Patterns  Current physical activity routine includes: chores.     Limitations from being physically active on a regular basis includes: no    She describes her general health as: good    Past Medical History  Past Medical History:   Diagnosis Date   ? Hypertension 2014    gestastional   ? Seizures     one time seizure     Patient Active Problem List   Diagnosis   ? Normal delivery   ? Chorioamnionitis, delivered, current hospitalization   ? Postpartum hemorrhage     HTN: not usually, up today  Dyslipidemia: no  ERIC: no  Obesity Hypoventilation: NO  DM2: no DM1: no DX: no Most recent AIC: no  Neuropathy: no  Nephropathy: no  Retinopathy: no  IFG or \"pre-DM\": no  MI: no  CVA:no  CHF: no  Previous cardiac testing includes: no  Cancers: no  Kidney Disease: no  DVT: no  PE: no  Colitis: no  Crohn's: no  IBS: no  PUD: no  Fatty Liver: no  Abnormal LFTs: no  Hepatitis: no  Asthma: no  Bronchitis: no  Pneumonia: no  Other Lung Problems: no  Back Pain:no  DDD: no  Gout: no  Fibromyalgia: no  Severe Headaches: no  Seizures: no If so, last seizure: no  Pseudotumor: no  PCOS: no  Menstrual Irregularity: no  Menorrhagia: no  Infertility: no  Thyroid problems: no  Thyroid medications: no  Glaucoma: no  HIV positive: NO  MRSA/VRE history: no  History of Blood transfusion: no  Anemia: no    Medications   Current Outpatient Prescriptions   Medication Sig Dispense Refill   ? levonorgestrel (MIRENA) 20 mcg/24 hr (5 years) IUD 1 each by Intrauterine route once.       No current facility-administered medications for " this visit.      Allergies   Review of patient's allergies indicates no known allergies.  Past Surgical History  Past Surgical History:   Procedure Laterality Date   ? COLONOSCOPY      having bleeding   ? WISDOM TOOTH EXTRACTION       History of problems with anesthesia: no  History of Malignant Hyperthermia: NO    Gynecological History     Menarche: 15  Regular: yes  Currently: LMP was (IUD in place in ), once yearly now.  Problems getting pregnant: no  MD Involvement: na If so, explanation/Diagnosis: no  : 1  Para: 1  C-S: no  Vaginal deliveries: 1  SAB:0  EAB: 0  Gestational DM: 0  Gestational HTN: 0  Preeclampsia: 0  Current Birth Control Method: IUD    Family History  family history includes Alcohol abuse in her father and mother; Depression in her sister; Diabetes in her maternal grandmother; Heart disease in her maternal grandfather; Hypertension in her maternal grandmother.    Social History  Social History     Social History   ? Marital status: Single     Spouse name: N/A   ? Number of children: N/A   ? Years of education: N/A     Social History Main Topics   ? Smoking status: Never Smoker   ? Smokeless tobacco: Never Used   ? Alcohol use No      Comment: rare/social occasions.   ? Drug use: No   ? Sexual activity: Yes     Partners: Male     Birth control/ protection: IUD      Comment: 3.6 yo child     Other Topics Concern   ? None     Social History Narrative     Work Status: 8-4:30 doing [employer].      Addiction History  Current or Past history of alcohol or substance abuse: no  Last used: no  Chemical Dependency Treatment History: no  Chemicals: no  Informed about need to be tobacco free 2 months before and indefinitely after surgery due to risk of Marginal Ulcers.    Psychiatric History  Diagnoses: no  Treated by: no  Psychiatric Hospitalizations: no  Suicide attempts: no  Panic attacks: no  History of Abuse: childhood exposure to alcohol abuse.    Palliative Medicine History  Involvement  "in a pain clinic: no    Dental History  Missing teeth: right lower.  Pending Dental Work: implant planned  Regular Dental Visits: yes  Dentures/Partials: no              ROS:    Snoring: no  Witnessed Apneas no  PND no  [place] Score: 6  STOP BAN, Mallampati 1  General  Fatigue: no  Sleep Quality:good.  HEENT  Visual changes: no  Cardiovascular  Murmur: no  Elevated BP: just today  Chest Pain with Exertion: no  Dyspnea with Exertion: no  Palpitations: no  Lower Extremity Edema: no  Syncope: seizure vs syncope at age 19, checked out after hospitalization.  Blood Clotting Problems:  no  Pulmonary  Shortness of breath at rest: no  Wheezing: no  CPAP use: no  Gastrointestinal  Trouble swallowing:no  Heartburn: no  HX UGI/EGD: no  Abdominal pain: no  Hematochezia: no  Once had colonoscopy in  that checked out (some bleeding leading up to it).  Urologic  Hesitancy: no  Urgency: no  Bloody Urine:no  Genitourinary  ED: na  Menorrhagia: no  Dysmenorrhea: no  Neurologic  Severe headache:no  Paresthesias: no  Psychiatric  Moods Stable: yes  Hallucinations: no  Rheumatologic  Myalgias: no  Arthralgias: no  Endocrine  Polydipsia: no  Polyuria: no  Galactorrhea: no  Heat intolerance: yes  Hirsutism: no  Musculoskeletal  Joint pain:no  Falls: no  Use of cane, crutch or motorized scooter: no  Hematologic  Abnormal Bleeding or Clotting: no  Dermatologic  Skin Tags: no  Striae: some  Furuncles/boils: no  Acne: no  Intertrigo: no  Lower Leg ulcers: no      Physical Exam  Vitals: /86  Pulse 78  Resp 18  Ht 5' 6.25\" (1.683 m)  Wt (!) 266 lb (120.7 kg)  SpO2 100%  Breastfeeding? No  BMI 42.61 kg/m2  Height:   Ht Readings from Last 1 Encounters:   18 5' 6.25\" (1.683 m)     Weight:   Wt Readings from Last 1 Encounters:   18 (!) 266 lb (120.7 kg)     Height: 5' 6.25\" (1.683 m) (2018 12:29 PM)  Initial Weight: 266 lbs (2018 12:29 PM)  Weight: 266 lb (120.7 kg) (2018 12:29 PM)  Weight loss from " initial: 0 (4/9/2018 12:29 PM)  % Weight loss: 0 % (4/9/2018 12:29 PM)  BMI (Calculated): 42.6 (4/9/2018 12:29 PM)  SpO2: 100 % (4/9/2018 12:29 PM)  Waist Circumference (In): 48 Inches (4/9/2018 12:29 PM)  Hip Circumference (In): 53 Inches (4/9/2018 12:29 PM)  Neck Circumference (In): 17 Inches (4/9/2018 12:29 PM)  Body mass index is 42.61 kg/(m^2).    General Appearance  No acute distress. Obesity: class III  Alert: yes  Sleepy: no  HEENT  PERRLA, EOMI  Neck  Stout: no nodules.  No carotid bruits  Airway: [email or web address]  Cardiovascular  Rhythm regular Rate Regular  Murmur: none  Pulmonary  [place] Score: 6  Lungs clear to ascultation  Abdomen  No rashes.   Post surgical Scars: none.  Extremities:  Pitting edema: none  Palpable distal pulses: 2 plus radail  Varicose veins: none  Neurologic  Tremors: none  Alert and oriented with intact cognition.   Speech fluent and cranial nerves are grossly intact.  Psychiatric  Thought Content Organized  Mood appears stable  Endocrine  Moon Facies: NO  Dorsal Thoracic Prominence: NO  Skin tags: none  Acanthosis nigricans: none  Dermatologic  Intertrigo: none. No pallor or rash.      Intake Photos:            LABS on File:    No results found for: 68904  No results found for: 7597    WBC   Date Value Ref Range Status   10/03/2017 10.8 4.0 - 11.0 thou/uL Final     Hemoglobin   Date Value Ref Range Status   10/03/2017 14.6 12.0 - 16.0 g/dL Final   09/24/2014 8.4 (L) 12.0 - 16.0 g/dL Final     Hematocrit   Date Value Ref Range Status   10/03/2017 41.9 35.0 - 47.0 % Final     MCV   Date Value Ref Range Status   10/03/2017 83 80 - 100 fL Final     Platelets   Date Value Ref Range Status   10/03/2017 289 140 - 440 thou/uL Final         AST   Date Value Ref Range Status   09/15/2014 13 0 - 40 U/L Final     ALT   Date Value Ref Range Status   09/15/2014 12 0 - 45 U/L Final     No results found for: LIPASE    INR   Date Value Ref Range Status   09/22/2014 1.01 0.90 - 1.10 Final       No  results found for: HGBA1C    No results found for: TSH  No results found for: PTH  No results found for: [id number]  No results found for: FERRITIN    No results found for: TRIG  No results found for: CHOL  No results found for: HDL    No results found for: FOLATE  No components found for: THIAMINE  No results found for: TESTOSTERONE  No results found for: [id number]        Counseled on what to expect regarding the post operative dietary recommendations which include:  -eating 3 meals daily  -eating protein first, getting >60gm protein daily 80gm if duodenal switch  -eating slowly, chewing food well  -avoiding/limiting calorie containing beverages  -drinking water 15-30 minutes before or after meals  -choosing wheat, not white with breads, crackers, pastas, joaquina, bagels, tortillas, rice  -limiting restaurant or cafeteria eating to twice a week or less    We discussed the importance of restorative sleep and stress management in maintaining a healthy weight.  We discussed the National Weight Control Registry healthy weight maintenance strategies and ways to optimize metabolism.  We discussed the importance of physical activity including cardiovascular and strength training in maintaining a healthier weight.  We discussed the importance of lifelong vitamin supplementation and lifelong follow-up.    Shena Chamorro was reminded that, postoperatively,  to avoid marginal ulcers she should avoid tobacco at all, alcohol in excess, caffeine in excess, and NSAIDS (unless indicated for cardioprotection or othewise and opposed by a PPI).     She was also reminded on the lifelong need for vitamin supplementation after bariatric surgery due to post operative malabsorption to maintain adequate nutrition and health.    She was reminded that after bariatric surgery alcohol will affect her differently and she should not drive after consuming even one alcoholic beverage.  Time spent 60 minutes face-to-face with over 50% of the  time spent counseling about how excess weight may affect her health, improving dietary hygiene and habits, details of the bariatric surgery pathway and our expectations and requirements prior to any surgery, and coordination of treatment plan.

## 2021-06-27 NOTE — PROGRESS NOTES
Progress Notes by Danilo Gonzalez MD at 4/19/2019  8:40 AM     Author: Danilo Gonzalez MD Service: -- Author Type: Physician    Filed: 4/19/2019  9:40 AM Encounter Date: 4/19/2019 Status: Signed    : Danilo Gonzalez MD (Physician)       FEMALE PREVENTATIVE EXAM    Assessment and Plan:       1. Routine medical exam    Generally the patient is doing very well.  We discussed healthy lifestyles, including adequate exercise (3-4 times a week for 20-30 minutes), and a healthy diet.  Patient should return for annual physicals, and we can also see them here as needed.       2. Essential hypertension, benign    This is I enough at this point where I would suggest we treat this with medication and the patient agrees.  We can start her on a low-dose of hydrochlorothiazide.  I like to see her back in a few weeks to make sure that she is improving.  This may not be enough of a dose or she may need another medication on top of this if her blood pressures not responding quickly and to a level where I would like to have it.  We can also draw a bit of blood today including a basic profile and a TSH to make sure that those are normal.  We also discussed lifestyle changes including changing her diet, losing weight, increasing her levels of exercise and she will also be pursuing all of these as a better lifestyle change in addition to the medications that we are using.      - hydroCHLOROthiazide (HYDRODIURIL) 12.5 MG tablet; Take 1 tablet (12.5 mg total) by mouth daily.  Dispense: 90 tablet; Refill: 3  - Basic Metabolic Panel  - Thyroid Stimulating Hormone (TSH)    3. Generalized anxiety disorder    Her anxiety was really to the point where it is almost disabling and bordering on agoraphobia if not outright paranoia at times.  She would like to get on a medication for that so we will start her on some escitalopram and see how that works for her.  I will see her back in a couple of weeks to make sure that she is improving.  We  discussed side effects and potential interactions with this medication.  She will let me know sooner by my chart if she is not tolerating, otherwise we will see her in 4 weeks to see how she is doing on it.    - escitalopram oxalate (LEXAPRO) 10 MG tablet; Take 1 tablet (10 mg total) by mouth daily.  Dispense: 30 tablet; Refill: 2    4. Morbid obesity (H)      5. Screening, lipid    - Lipid Profile        Next follow up:  No follow-ups on file.    Immunization Review  Adult Imm Review: No immunizations due today  BMI: 43    I discussed the following with the patient:   Adult Healthy Living: Importance of regular exercise  Healthy nutrition  Getting adequate sleep  Stress management  Use of seat belts        Subjective:   Chief Complaint: Shena Chamorro is an 33 y.o. female here for a preventative health visit.     HPI: 33-year-old female here today for a physical.  She is typically a new patient to us and having not been here for more than 3 years.  She was not not having insurance for some time and now she is able to have insurance and is wanting to be seen for a physical.    She has been noticing that her blood pressure has been going up quite a bit.  She gets blood on a very regular basis, and the last couple of times have turned her down because her blood pressures been too high.  It has been also probably contributing to some headaches that she is having, which she describes as a throbbing up into her head during the day at times.  These are a bit sporadic in nature but they do bother her.  She has no vision changes, blurry vision, double vision.  She has had no swelling into her legs at all.    She has been struggling with anxiety as well.  She describes a lot of anxiety over the last couple of years.  She is noticed it particularly since the birth of her child about 4 years ago.  She states that she almost has times where she was not willing to go outside because she is fearful.  She has a lot of fears  "around the mass shootings, and if bad things will happen to her even in the grocery store or public areas, so she is been a bit more withdrawn recently.  She knows that this is not logical and contributing to her anxiety which is in turn probably contributing to her blood pressure issues as well.        Healthy Habits  Are you taking a daily aspirin? No  Do you typically exercising at least 40 min, 3-4 times per week?  Yes  Do you usually eat at least 4 servings of fruit and vegetables a day, include whole grains and fiber and avoid regularly eating high fat foods? NO  Have you had an eye exam in the past two years? NO  Do you see a dentist twice per year? NO. Once a year  Do you have any concerns regarding sleep? No    Safety Screen  If you own firearms, are they secured in a locked gun cabinet or with trigger locks? The patient does not own any firearms  Do you feel you are safe where you are living?: Yes (8/24/2018  5:52 PM)  Do you feel you are safe in your relationship(s)?: Yes (8/24/2018  5:52 PM)      Review of Systems:  Please see above.  The rest of the review of systems are negative for all systems.     Pap History:   No - age 30-65 PAP every 3 years recommended  Cancer Screening       Status Date      PAP SMEAR Next Due 10/30/2019      Done 10/30/2014           Patient Care Team:  Danilo Gonzalez MD as PCP - General (Family Medicine)        History     Reviewed By Date/Time Sections Reviewed    Danilo Gonzalez MD 4/19/2019  9:39 AM Medical, Surgical        Patient is new patient to the clinic. Please see past medical history, surgical history, social history and family history, all of which were completed in their entirety today.      Objective:   Vital Signs:   Visit Vitals  BP (!) 184/114 (Patient Site: Left Arm, Patient Position: Sitting, Cuff Size: Adult Large)   Pulse 74   Ht 5' 7.5\" (1.715 m)   Wt (!) 283 lb 1.6 oz (128.4 kg)   SpO2 100%   BMI 43.69 kg/m           PHYSICAL EXAM    Well developed, well " nourished, no acute distress.  HEENT: normocephalic/atraumatic, PERRLA/EOMI, TMs: Gray, normal light reflex, no nasal discharge.  Oral mucosa: no erythema/exudate  Neck: No LAD/masses/thyromegaly/bruits  Lungs: clear bilaterally  Heart: regular rate and rhythm, no murmurs/gallops/rubs  Abdomen: Normal bowel sounds, soft, non-tender, non-distended, no masses, neg Edwards's/McBurney's, no rebound/guarding  Lymphatics: no supraclavicular/axillary/epitrochlear/inguinal LAD. No edema.  Neuro: A&O x 3, CN II-XII intact, strength 5/5, reflexes symmetric, sensory intact to light touch.  Psych: Behavior appropriate, engaging.  Thought processes congruent, non-tangential.  Musculoskeletal: no gross deformities.  Skin: no rashes or lesions.            Medication List           Accurate as of 4/19/19  9:40 AM. If you have any questions, ask your nurse or doctor.               START taking these medications    escitalopram oxalate 10 MG tablet  Also known as:  LEXAPRO  INSTRUCTIONS:  Take 1 tablet (10 mg total) by mouth daily.  Started by:  Danilo Gonzalez MD        hydroCHLOROthiazide 12.5 MG tablet  Also known as:  HYDRODIURIL  INSTRUCTIONS:  Take 1 tablet (12.5 mg total) by mouth daily.  Started by:  Danilo Gonzalez MD           CONTINUE taking these medications    MIRENA 20 mcg/24 hours (5 yrs) 52 mg IUD  INSTRUCTIONS:  1 each by Intrauterine route once.  Generic drug:  levonorgestrel           STOP taking these medications    buPROPion 75 MG tablet  Also known as:  WELLBUTRIN  Stopped by:  Danilo Gonzalez MD     cyclobenzaprine 10 MG tablet  Also known as:  FLEXERIL  Stopped by:  Danilo Gonzalez MD     lidocaine 5 %  Also known as:  LIDODERM  Stopped by:  Danilo Gonzalez MD     methocarbamol 500 MG tablet  Also known as:  ROBAXIN  Stopped by:  Danilo Gonzalez MD     naltrexone 50 mg tablet  Also known as:  DEPADE  Stopped by:  Danilo Gonzalez MD           Where to Get Your Medications      These medications were sent to [id number] - [place], [place] - [address] AT [facility] [address] [place] [address], [facility] [address]    Hours:  24-hours Phone:  [phone]     escitalopram oxalate 10 MG tablet    hydroCHLOROthiazide 12.5 MG tablet         Additional Screenings Completed Today:
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
## 2021-07-03 NOTE — ADDENDUM NOTE
Addendum Note by Paulette Christine CRNA at 8/31/2020  4:00 PM     Author: Paulette Christine CRNA Service: -- Author Type: Nurse Anesthetist    Filed: 8/31/2020  4:00 PM Date of Service: 8/31/2020  4:00 PM Status: Signed    : Paulette Christine CRNA (Nurse Anesthetist)       Addendum  created 08/31/20 1600 by Paulette Christine CRNA    Intraprocedure Event edited

## 2021-08-03 PROBLEM — E66.01 MORBID OBESITY (H): Status: RESOLVED | Noted: 2020-06-25 | Resolved: 2020-07-13

## 2021-08-03 PROBLEM — E66.01 MORBID OBESITY (H): Status: RESOLVED | Noted: 2019-04-19 | Resolved: 2019-11-04

## 2021-08-14 ENCOUNTER — HEALTH MAINTENANCE LETTER (OUTPATIENT)
Age: 36
End: 2021-08-14

## 2021-08-15 ENCOUNTER — MYC MEDICAL ADVICE (OUTPATIENT)
Dept: FAMILY MEDICINE | Facility: CLINIC | Age: 36
End: 2021-08-15

## 2021-08-15 DIAGNOSIS — F41.1 GENERALIZED ANXIETY DISORDER: Primary | ICD-10-CM

## 2021-08-16 RX ORDER — ALPRAZOLAM 0.25 MG
0.25 TABLET ORAL
Qty: 30 TABLET | Refills: 0 | Status: SHIPPED | OUTPATIENT
Start: 2021-08-16 | End: 2021-11-09

## 2021-08-16 RX ORDER — ALPRAZOLAM 0.25 MG
0.25 TABLET ORAL
COMMUNITY
Start: 2021-05-27 | End: 2021-08-16

## 2021-08-16 NOTE — TELEPHONE ENCOUNTER
Last OV 7/23/20 for Hospital follow-up -no follow up listed   Last fill 5/27/21    Please advise fill- does she need to be seen prior.    RICHMOND BarrosA

## 2021-10-10 ENCOUNTER — HEALTH MAINTENANCE LETTER (OUTPATIENT)
Age: 36
End: 2021-10-10

## 2021-11-09 ENCOUNTER — MYC REFILL (OUTPATIENT)
Dept: FAMILY MEDICINE | Facility: CLINIC | Age: 36
End: 2021-11-09

## 2021-11-09 DIAGNOSIS — F41.1 GENERALIZED ANXIETY DISORDER: ICD-10-CM

## 2021-11-10 NOTE — TELEPHONE ENCOUNTER
Routing refill request to provider for review/approval because:  Controlled substance request.    Last Written Prescription Date:  8/16/21  Last Fill Quantity: 30,  # refills: 0   Last office visit provider:  7/23/20     Requested Prescriptions   Pending Prescriptions Disp Refills     ALPRAZolam (XANAX) 0.25 MG tablet [Pharmacy Med Name: ALPRAZOLAM 0.25MG TABLETS] 30 tablet      Sig: TAKE 1 TABLET(0.25 MG) BY MOUTH EVERY NIGHT AS NEEDED FOR ANXIETY       There is no refill protocol information for this order          Alexandrea Sheppard RN 11/10/21 1:06 PM

## 2021-11-10 NOTE — TELEPHONE ENCOUNTER
Routing refill request to provider for review/approval because:  Drug not on the [facility] refill protocol     Last Written Prescription Date:     Last office visit provider:  7/23/2021 with primary care provider: Danilo Gonzalez for hosp follow-up appointment.     Requested Prescriptions   Pending Prescriptions Disp Refills     ALPRAZolam (XANAX) 0.25 MG tablet 30 tablet 0     Sig: Take 1 tablet (0.25 mg) by mouth nightly as needed for anxiety       There is no refill protocol information for this order          Soledad Page [facility] 11/10/21 12:18 PM

## 2021-11-11 RX ORDER — ALPRAZOLAM 0.25 MG
0.25 TABLET ORAL
Qty: 30 TABLET | Refills: 0 | Status: SHIPPED | OUTPATIENT
Start: 2021-11-11 | End: 2022-04-06

## 2021-11-16 RX ORDER — ALPRAZOLAM 0.25 MG
TABLET ORAL
Qty: 30 TABLET | OUTPATIENT
Start: 2021-11-16

## 2022-02-13 ENCOUNTER — MYC MEDICAL ADVICE (OUTPATIENT)
Dept: FAMILY MEDICINE | Facility: CLINIC | Age: 37
End: 2022-02-13

## 2022-02-13 DIAGNOSIS — J20.9 ACUTE BRONCHITIS, UNSPECIFIED ORGANISM: Primary | ICD-10-CM

## 2022-02-14 RX ORDER — PREDNISONE 10 MG/1
TABLET ORAL
Qty: 30 TABLET | Refills: 0 | Status: SHIPPED | OUTPATIENT
Start: 2022-02-14 | End: 2022-02-26

## 2022-02-14 RX ORDER — AZITHROMYCIN 250 MG/1
TABLET, FILM COATED ORAL
Qty: 6 TABLET | Refills: 0 | Status: SHIPPED | OUTPATIENT
Start: 2022-02-14 | End: 2022-02-19

## 2022-04-06 ENCOUNTER — MYC REFILL (OUTPATIENT)
Dept: FAMILY MEDICINE | Facility: CLINIC | Age: 37
End: 2022-04-06

## 2022-04-06 DIAGNOSIS — F41.1 GENERALIZED ANXIETY DISORDER: ICD-10-CM

## 2022-04-08 RX ORDER — ALPRAZOLAM 0.25 MG
0.25 TABLET ORAL
Qty: 30 TABLET | Refills: 0 | Status: SHIPPED | OUTPATIENT
Start: 2022-04-08 | End: 2022-06-07

## 2022-04-08 NOTE — TELEPHONE ENCOUNTER
Routing refill request to provider for review/approval because:  Drug not on the [facility] refill protocol   Controlled substance    Last Written Prescription Date:  11/11/2021  Last Fill Quantity: 30,  # refills: 0   Last office visit provider:  7/23/2020     Requested Prescriptions   Pending Prescriptions Disp Refills     ALPRAZolam (XANAX) 0.25 MG tablet 30 tablet 0     Sig: Take 1 tablet (0.25 mg) by mouth nightly as needed for anxiety       There is no refill protocol information for this order          Francesca Posey RN 04/07/22 10:16 PM

## 2022-06-03 DIAGNOSIS — F41.1 GENERALIZED ANXIETY DISORDER: ICD-10-CM

## 2022-06-05 NOTE — TELEPHONE ENCOUNTER
Routing refill request to provider for review/approval because:  Drug not on the [facility] refill protocol     Last Written Prescription Date:  4/8/22  Last Fill Quantity: 30,  # refills: 0   Last office visit provider:  7/23/20     Requested Prescriptions   Pending Prescriptions Disp Refills     ALPRAZolam (XANAX) 0.25 MG tablet [Pharmacy Med Name: ALPRAZOLAM 0.25MG TABLETS] 30 tablet      Sig: TAKE 1 TABLET(0.25 MG) BY MOUTH EVERY NIGHT AS NEEDED FOR ANXIETY       There is no refill protocol information for this order          Antonia Francisco, RN 06/05/22 10:11 AM

## 2022-06-07 RX ORDER — ALPRAZOLAM 0.25 MG
TABLET ORAL
Qty: 30 TABLET | Refills: 0 | Status: SHIPPED | OUTPATIENT
Start: 2022-06-07

## 2022-09-18 ENCOUNTER — HEALTH MAINTENANCE LETTER (OUTPATIENT)
Age: 37
End: 2022-09-18

## 2023-10-08 ENCOUNTER — HEALTH MAINTENANCE LETTER (OUTPATIENT)
Age: 38
End: 2023-10-08